# Patient Record
Sex: MALE | Race: BLACK OR AFRICAN AMERICAN | Employment: FULL TIME | URBAN - METROPOLITAN AREA
[De-identification: names, ages, dates, MRNs, and addresses within clinical notes are randomized per-mention and may not be internally consistent; named-entity substitution may affect disease eponyms.]

---
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
## 2024-03-27 ENCOUNTER — HOSPITAL ENCOUNTER (EMERGENCY)
Age: 19
Discharge: HOME | End: 2024-03-27
Attending: STUDENT IN AN ORGANIZED HEALTH CARE EDUCATION/TRAINING PROGRAM
Payer: COMMERCIAL

## 2024-03-27 ENCOUNTER — APPOINTMENT (OUTPATIENT)
Dept: GENERAL RADIOLOGY | Age: 19
End: 2024-03-27
Payer: COMMERCIAL

## 2024-03-27 VITALS
BODY MASS INDEX: 28.25 KG/M2 | HEIGHT: 67 IN | OXYGEN SATURATION: 98 % | HEART RATE: 82 BPM | WEIGHT: 180 LBS | SYSTOLIC BLOOD PRESSURE: 168 MMHG | RESPIRATION RATE: 16 BRPM | DIASTOLIC BLOOD PRESSURE: 76 MMHG | TEMPERATURE: 98.2 F

## 2024-03-27 DIAGNOSIS — K21.9 MILD ACID REFLUX: Primary | ICD-10-CM

## 2024-03-27 PROCEDURE — 74022 RADEX COMPL AQT ABD SERIES: CPT

## 2024-03-27 PROCEDURE — 93005 ELECTROCARDIOGRAM TRACING: CPT | Performed by: NURSE PRACTITIONER

## 2024-03-27 PROCEDURE — 99284 EMERGENCY DEPT VISIT MOD MDM: CPT

## 2024-03-27 PROCEDURE — 6370000000 HC RX 637 (ALT 250 FOR IP): Performed by: NURSE PRACTITIONER

## 2024-03-27 RX ORDER — MAG HYDROX/ALUMINUM HYD/SIMETH 400-400-40
15 SUSPENSION, ORAL (FINAL DOSE FORM) ORAL EVERY 6 HOURS PRN
Qty: 355 ML | Refills: 0 | Status: SHIPPED | OUTPATIENT
Start: 2024-03-27

## 2024-03-27 RX ORDER — MAGNESIUM HYDROXIDE/ALUMINUM HYDROXICE/SIMETHICONE 120; 1200; 1200 MG/30ML; MG/30ML; MG/30ML
30 SUSPENSION ORAL ONCE
Status: COMPLETED | OUTPATIENT
Start: 2024-03-27 | End: 2024-03-27

## 2024-03-27 RX ADMIN — ALUMINUM HYDROXIDE, MAGNESIUM HYDROXIDE, AND SIMETHICONE 30 ML: 1200; 120; 1200 SUSPENSION ORAL at 21:53

## 2024-03-27 ASSESSMENT — PAIN SCALES - GENERAL
PAINLEVEL_OUTOF10: 8
PAINLEVEL_OUTOF10: 8

## 2024-03-27 ASSESSMENT — VISUAL ACUITY: OU: 1

## 2024-03-27 ASSESSMENT — ENCOUNTER SYMPTOMS
COUGH: 0
ABDOMINAL PAIN: 0
SHORTNESS OF BREATH: 0
VOMITING: 0
BACK PAIN: 0
NAUSEA: 0

## 2024-03-27 ASSESSMENT — PAIN DESCRIPTION - LOCATION: LOCATION: CHEST

## 2024-03-27 ASSESSMENT — PAIN - FUNCTIONAL ASSESSMENT: PAIN_FUNCTIONAL_ASSESSMENT: 0-10

## 2024-03-28 NOTE — ED PROVIDER NOTES
chest pain.   Gastrointestinal:  Negative for abdominal pain, nausea and vomiting.   Musculoskeletal:  Negative for back pain.   Neurological:  Negative for dizziness.   All other systems reviewed and are negative.       Except as noted above the remainder of the review of systems was reviewed and negative.     PHYSICAL EXAM    (up to 7 for level 4, 8 or more for level 5)     ED Triage Vitals [03/27/24 2104]   BP Temp Temp Source Pulse Respirations SpO2 Height Weight - Scale   (!) 168/76 98.2 °F (36.8 °C) Oral 82 16 98 % 1.71 m (5' 7.32\") 81.6 kg (180 lb)     Physical Exam  Constitutional:       General: He is not in acute distress.     Appearance: Normal appearance. He is normal weight.   HENT:      Head: Normocephalic.      Right Ear: External ear normal.      Left Ear: External ear normal.      Nose: Nose normal.      Mouth/Throat:      Mouth: Mucous membranes are moist.   Eyes:      General: Lids are normal. Vision grossly intact.      Extraocular Movements: Extraocular movements intact.      Conjunctiva/sclera: Conjunctivae normal.   Cardiovascular:      Rate and Rhythm: Normal rate and regular rhythm.      Heart sounds: Normal heart sounds, S1 normal and S2 normal.   Pulmonary:      Effort: Pulmonary effort is normal.      Breath sounds: Normal breath sounds and air entry.   Chest:      Chest wall: No swelling, tenderness or crepitus.   Abdominal:      General: Bowel sounds are normal. There is no distension.      Palpations: Abdomen is soft.      Tenderness: There is no abdominal tenderness.      Hernia: No hernia is present.   Musculoskeletal:         General: Normal range of motion.      Cervical back: Full passive range of motion without pain, normal range of motion and neck supple.   Skin:     General: Skin is warm and dry.   Neurological:      Mental Status: He is alert and oriented to person, place, and time.      Cranial Nerves: Cranial nerves 2-12 are intact.      Sensory: Sensation is intact.

## 2024-03-28 NOTE — DISCHARGE INSTRUCTIONS
Take medication as prescribed.  Follow-up with your primary care provider.  Return to emergency department for worsening or new symptoms.

## 2024-03-29 LAB
EKG ATRIAL RATE: 63 BPM
EKG P AXIS: 63 DEGREES
EKG P-R INTERVAL: 138 MS
EKG Q-T INTERVAL: 384 MS
EKG QRS DURATION: 86 MS
EKG QTC CALCULATION (BAZETT): 392 MS
EKG R AXIS: 89 DEGREES
EKG T AXIS: 41 DEGREES
EKG VENTRICULAR RATE: 63 BPM

## 2024-05-14 ENCOUNTER — HOSPITAL ENCOUNTER (EMERGENCY)
Age: 19
Discharge: HOME | End: 2024-05-14
Attending: EMERGENCY MEDICINE
Payer: COMMERCIAL

## 2024-05-14 ENCOUNTER — APPOINTMENT (OUTPATIENT)
Dept: GENERAL RADIOLOGY | Age: 19
End: 2024-05-14
Payer: COMMERCIAL

## 2024-05-14 VITALS
HEART RATE: 63 BPM | DIASTOLIC BLOOD PRESSURE: 74 MMHG | SYSTOLIC BLOOD PRESSURE: 119 MMHG | TEMPERATURE: 98 F | RESPIRATION RATE: 16 BRPM | OXYGEN SATURATION: 100 %

## 2024-05-14 DIAGNOSIS — S43.005A DISLOCATION OF LEFT SHOULDER JOINT, INITIAL ENCOUNTER: Primary | ICD-10-CM

## 2024-05-14 PROCEDURE — 73030 X-RAY EXAM OF SHOULDER: CPT

## 2024-05-14 PROCEDURE — 99283 EMERGENCY DEPT VISIT LOW MDM: CPT

## 2024-05-14 ASSESSMENT — PAIN SCALES - GENERAL: PAINLEVEL_OUTOF10: 6

## 2024-05-14 ASSESSMENT — PAIN - FUNCTIONAL ASSESSMENT: PAIN_FUNCTIONAL_ASSESSMENT: 0-10

## 2024-05-14 NOTE — DISCHARGE INSTRUCTIONS
Return to this emergency room immediately if your symptoms persist, worsen or if new ones form.    Make sure you follow-up with Dr. Carreno within the next 1-2 business days.

## 2024-05-14 NOTE — ED PROVIDER NOTES
EMERGENCY DEPARTMENT ENCOUNTER    Pt Name: Alan Briscoe  MRN: [id number]  Birthdate 2005  Date of evaluation: 5/14/24  CHIEF COMPLAINT       Chief Complaint   Patient presents with    Shoulder Injury     Left, thinks it is out of place after wrestling with his brother x1.5 hours PTA to ED     HISTORY OF PRESENT ILLNESS   The history is provided by the patient and medical records.    The patient is a 19-year-old male who presents to the ED for left shoulder dislocate.  Patient states he was wrestling with his brother earlier this evening and felt his shoulder pop out of joint.  He felt as if the shoulder was hanging lower than it typically does.  After few minutes it popped back in and he came into the ED for further evaluation.  Denies prior shoulder dislocation.  No other injuries reported.  No numbness or tingling.  No weakness.    REVIEW OF SYSTEMS     Review of Systems  All other systems reviewed and are negative.    PASTMEDICAL HISTORY   History reviewed. No pertinent past medical history.  Past Problem List  There is no problem list on file for this patient.    SURGICAL HISTORY     History reviewed. No pertinent surgical history.  CURRENT MEDICATIONS       Discharge Medication List as of 5/14/2024  3:33 AM        CONTINUE these medications which have NOT CHANGED    Details   aluminum & magnesium hydroxide-simethicone (MAALOX MAX) 400-400-40 MG/5ML SUSP Take 15 mLs by mouth every 6 hours as needed (Acid reflux), Disp-355 mL, R-0Normal           ALLERGIES     has No Known Allergies.  FAMILY HISTORY     has no family status information on file.      SOCIAL HISTORY       Social History     Tobacco Use    Smoking status: Never    Smokeless tobacco: Never   Substance Use Topics    Alcohol use: No    Drug use: No     PHYSICAL EXAM     INITIAL VITALS: /74   Pulse 63   Temp 98 °F (36.7 °C) (Oral)   Resp 16   SpO2 100%    Physical Exam  Constitutional:       Appearance: Normal appearance.   HENT:

## 2024-05-20 ENCOUNTER — OFFICE VISIT (OUTPATIENT)
Dept: ORTHOPEDIC SURGERY | Age: 19
End: 2024-05-20
Payer: COMMERCIAL

## 2024-05-20 VITALS — RESPIRATION RATE: 16 BRPM | HEIGHT: 67 IN | WEIGHT: 180 LBS | BODY MASS INDEX: 28.25 KG/M2

## 2024-05-20 DIAGNOSIS — S43.015A ANTERIOR SHOULDER DISLOCATION, LEFT, INITIAL ENCOUNTER: Primary | ICD-10-CM

## 2024-05-20 PROCEDURE — 99203 OFFICE O/P NEW LOW 30 MIN: CPT

## 2024-05-20 NOTE — PROGRESS NOTES
never smoked. He has never used smokeless tobacco. He reports that he does not drink alcohol and does not use drugs.    Family History  Phillips's family history is not on file.      Review of Systems   History obtained from the patient.   REVIEW OF SYSTEMS:   Constitution: negative for fever, chills, weight loss or malaise   Musculoskeletal: As noted in the HPI   Neurologic: As noted in the HPI    Physical Exam  Resp 16   Ht 1.702 m (5' 7\")   Wt 81.6 kg (180 lb)   BMI 28.19 kg/m²    General Appearance: alert, well appearing, and in no distress  Mental Status: alert, oriented to person, place, and time    Left shoulder and upper extremity: No warmth, erythema, ecchymosis or obvious swelling.  No obvious deformity.  2+ radial pulse with brisk capillary refill.  Sensation is grossly intact to light touch in all dermatomal patterns.  Radial, ulnar, median nerve motor function is intact.  Patient is able to actively flex and extend all fingers.  Full range of motion at the elbow, wrist, hand.  Patient does have some tightness and restriction with gentle passive forward elevation.  He does have some tenderness to palpation over the posterior aspect of the shoulder.  Mild pain with cross body adduction.    Imaging Studies  2 view xrays of the left shoulder obtained on 5/14/2024 were independently reviewed demonstrating no acute fracture, dislocation, subluxation.  Glenohumeral joint is is appropriately aligned.    Diagnostics and Labs  Relevant diagnostic, laboratory and radiological studies have been reviewed in the Electronic Medical Record.    Assessment and Plan  Alan Briscoe is a 19 y.o. old male who presented to the office today for evaluation of left shoulder pain after he experienced a first-time dislocation about a week ago.  I did discuss the etiology of a dislocated shoulder with the patient as well as treatment options in the form of conservative versus surgical management.  At this time we will proceed

## 2024-05-23 ENCOUNTER — TELEPHONE (OUTPATIENT)
Dept: ORTHOPEDIC SURGERY | Age: 19
End: 2024-05-23

## 2024-05-23 NOTE — TELEPHONE ENCOUNTER
Spoke with patient and evidently he and the physical therapy clinic have been playing phone tag where neither party have been able to speak directly to each other but have been leaving messages. There also seems to be some issue with the phone number that the patient had on file. Patient states that he is going to call PT clinic again to see about scheduling appt. Patient was instructed to call the office if for some reason the script needs to be faxed to any other physical therapy clinic.

## 2024-07-01 ENCOUNTER — OFFICE VISIT (OUTPATIENT)
Dept: ORTHOPEDIC SURGERY | Age: 19
End: 2024-07-01
Payer: COMMERCIAL

## 2024-07-01 VITALS — RESPIRATION RATE: 16 BRPM | WEIGHT: 179.9 LBS | BODY MASS INDEX: 28.24 KG/M2 | HEIGHT: 67 IN

## 2024-07-01 DIAGNOSIS — S43.015D ANTERIOR SHOULDER DISLOCATION, LEFT, SUBSEQUENT ENCOUNTER: Primary | ICD-10-CM

## 2024-07-01 PROCEDURE — 99213 OFFICE O/P EST LOW 20 MIN: CPT

## 2024-07-01 NOTE — PROGRESS NOTES
1.702 m (5' 7.01\")   Wt 81.6 kg (179 lb 14.3 oz)   BMI 28.17 kg/m²    Constitutional: Patient is oriented to person, place, and time. Patient appears well-developed and well nourished.   Mental Status/psychiatric: Behavior is normal. Thought content normal.  HENT: Negative otherwise noted  Head: Normocephalic and Atraumatic  Nose: Normal  Respiratory/Cardio: Effort normal. No respiratory distress.  Neck: Normal range of motion Neck supple.    Shoulder:    Skin: Skin is warm and dry; no swelling or obvious muscular atrophy.  No ecchymosis or obvious deformity.  Vasculature: 2+ radial pulses bilaterally  Neuro: Sensation grossly intact to light touch diffusely  Tenderness: Mild tenderness over the posterior lateral aspect of the shoulder.  No tenderness to palpation throughout the remaining shoulder.    ROM: (Degrees)    Right   A P   Left   A P    Elevation  150    Elevation  150 155  Abduction  160    Abduction  160  160  ER   65    ER   60 65  IR   L2    IR   L2   90 abd/ER      90 abd/ER     90 abd/IR      90 abd/IR     Crepitation  No    Crepitation No  Dyskenesia  No    Dyskenesia No      Muscle strength:    Right       Left    Deltoid   5    Deltoid   5  Supraspinatus  5    Supraspinatus  5  ER   5    ER   5  IR   5    IR   5    Special tests    Right   Rotator Cuff    Left    n   Painful arc    n   n   Pain with ER    n    n   Neer's     n    n   Hawkin's    n    n   Drop Arm    n  n   Lift off/Belly Press   n  n   ER Lag    n          AC Joint  n   AC tenderness   n  n   Cross-chest adduction  n       Labrum/biceps    n   Rice's    n   n   Biceps sheer    n      n   Speed's/Yergason's   n    n   Tenderness Biceps Groove  n    n   Lee's    n         Instability  n   Ant Apprehension   Y (mild)    n   Post Apprehension   n    n   Ant Load shift    n    n   Post Load shift   n   n   Sulcus     n  n   Generalized Laxity   n  n   Relocation test   y  n   Crank test     n  n   Koko-superior

## 2024-08-12 ENCOUNTER — OFFICE VISIT (OUTPATIENT)
Dept: ORTHOPEDIC SURGERY | Age: 19
End: 2024-08-12
Payer: COMMERCIAL

## 2024-08-12 VITALS — HEIGHT: 67 IN | WEIGHT: 192.2 LBS | BODY MASS INDEX: 30.17 KG/M2

## 2024-08-12 DIAGNOSIS — S43.015D ANTERIOR DISLOCATION OF LEFT SHOULDER, SUBSEQUENT ENCOUNTER: Primary | ICD-10-CM

## 2024-08-12 DIAGNOSIS — S43.015A ANTERIOR SHOULDER DISLOCATION, LEFT, INITIAL ENCOUNTER: ICD-10-CM

## 2024-08-12 DIAGNOSIS — M25.512 LEFT SHOULDER PAIN, UNSPECIFIED CHRONICITY: ICD-10-CM

## 2024-08-12 PROCEDURE — 99213 OFFICE O/P EST LOW 20 MIN: CPT

## 2024-08-14 NOTE — PROGRESS NOTES
HPI: Mr. Briscoe is a 19-year-old male who presents to clinic today for a 3-month follow-up regarding his first-time anterior shoulder dislocation.  He states that he has been attending physical therapy and at this point has concluded therapy.  He states he feels like he is making good improvements in his range of motion and his strength although does still have some discomfort when he gets into certain positions.  He states that he has not had any additional shoulder dislocations or subluxation episodes.  Examination of the left shoulder and upper extremity demonstrate the skin to be clean, dry and intact without warmth erythema or ecchymosis.  No obvious swelling or deformity.  He has full range of motion at the shoulder and the feels of elevation, abduction, external rotation and internal rotation.  He has no painful arc of motion and negative Neer and Eugene.  He has a negative [place]'s.  He does have positive anterior apprehension as well as a positive relocation test unable to elicit a positive load shift on exam today.  He has 5 out of 5 muscle strength testing of the deltoid, supraspinatus, external rotation and internal rotation.    Impression/plan: Mr. Briscoe is a 19-year-old male who presents to clinic today for a 3-month follow-up regarding his first-time anterior shoulder dislocation.  We did again discuss his etiologies today including treatment options available to him at this time.  I do believe he is overall doing relatively well but he does again voiced concern that he has discomfort in certain positions and does feel unstable when he gets overhead.  We did again discuss that he could have a labrum tear associated with the dislocation he experienced 3 months ago.  We did discuss that we could continue with his exercises that he learned in therapy and follow-up if any more issues arise or we could proceed at this time with an MRI arthrogram to evaluate for any labral etiology.  At this time given

## 2024-08-15 ENCOUNTER — TELEPHONE (OUTPATIENT)
Dept: INTERVENTIONAL RADIOLOGY/VASCULAR | Age: 19
End: 2024-08-15

## 2024-09-06 ENCOUNTER — TELEPHONE (OUTPATIENT)
Dept: INTERVENTIONAL RADIOLOGY/VASCULAR | Age: 19
End: 2024-09-06

## 2024-10-24 ENCOUNTER — APPOINTMENT (OUTPATIENT)
Dept: GENERAL RADIOLOGY | Age: 19
End: 2024-10-24
Payer: COMMERCIAL

## 2024-10-24 ENCOUNTER — HOSPITAL ENCOUNTER (EMERGENCY)
Age: 19
Discharge: HOME | End: 2024-10-25
Attending: STUDENT IN AN ORGANIZED HEALTH CARE EDUCATION/TRAINING PROGRAM
Payer: COMMERCIAL

## 2024-10-24 VITALS
DIASTOLIC BLOOD PRESSURE: 64 MMHG | BODY MASS INDEX: 28.25 KG/M2 | HEIGHT: 67 IN | WEIGHT: 180 LBS | TEMPERATURE: 98.2 F | HEART RATE: 94 BPM | RESPIRATION RATE: 18 BRPM | SYSTOLIC BLOOD PRESSURE: 124 MMHG | OXYGEN SATURATION: 97 %

## 2024-10-24 DIAGNOSIS — M23.91 INTERNAL DERANGEMENT OF RIGHT KNEE: Primary | ICD-10-CM

## 2024-10-24 DIAGNOSIS — S86.811A RUPTURE OF RIGHT PATELLAR TENDON, INITIAL ENCOUNTER: ICD-10-CM

## 2024-10-24 PROCEDURE — 99283 EMERGENCY DEPT VISIT LOW MDM: CPT

## 2024-10-24 PROCEDURE — 6370000000 HC RX 637 (ALT 250 FOR IP): Performed by: PHYSICIAN ASSISTANT

## 2024-10-24 PROCEDURE — 73562 X-RAY EXAM OF KNEE 3: CPT

## 2024-10-24 RX ORDER — IBUPROFEN 800 MG/1
800 TABLET, FILM COATED ORAL ONCE
Status: COMPLETED | OUTPATIENT
Start: 2024-10-24 | End: 2024-10-24

## 2024-10-24 RX ADMIN — IBUPROFEN 800 MG: 800 TABLET ORAL at 23:37

## 2024-10-24 ASSESSMENT — PAIN SCALES - GENERAL
PAINLEVEL_OUTOF10: 7
PAINLEVEL_OUTOF10: 6

## 2024-10-24 ASSESSMENT — PAIN DESCRIPTION - LOCATION: LOCATION: KNEE

## 2024-10-24 ASSESSMENT — PAIN - FUNCTIONAL ASSESSMENT: PAIN_FUNCTIONAL_ASSESSMENT: 0-10

## 2024-10-25 ENCOUNTER — HOSPITAL ENCOUNTER (OUTPATIENT)
Dept: MRI IMAGING | Age: 19
Discharge: HOME | End: 2024-10-27
Payer: COMMERCIAL

## 2024-10-25 ENCOUNTER — OFFICE VISIT (OUTPATIENT)
Dept: ORTHOPEDIC SURGERY | Age: 19
End: 2024-10-25

## 2024-10-25 VITALS — WEIGHT: 190 LBS | RESPIRATION RATE: 18 BRPM | HEIGHT: 67 IN | BODY MASS INDEX: 29.82 KG/M2

## 2024-10-25 DIAGNOSIS — M25.561 ACUTE PAIN OF RIGHT KNEE: ICD-10-CM

## 2024-10-25 DIAGNOSIS — S86.811A PATELLAR TENDON RUPTURE, RIGHT, INITIAL ENCOUNTER: Primary | ICD-10-CM

## 2024-10-25 DIAGNOSIS — S86.811A PATELLAR TENDON RUPTURE, RIGHT, INITIAL ENCOUNTER: ICD-10-CM

## 2024-10-25 PROCEDURE — 73721 MRI JNT OF LWR EXTRE W/O DYE: CPT

## 2024-10-25 RX ORDER — 0.9 % SODIUM CHLORIDE 0.9 %
500 INTRAVENOUS SOLUTION INTRAVENOUS ONCE
Status: DISCONTINUED | OUTPATIENT
Start: 2024-10-25 | End: 2024-10-25

## 2024-10-25 RX ORDER — HYDROCODONE BITARTRATE AND ACETAMINOPHEN 5; 325 MG/1; MG/1
1 TABLET ORAL EVERY 6 HOURS PRN
Qty: 12 TABLET | Refills: 0 | Status: SHIPPED | OUTPATIENT
Start: 2024-10-25 | End: 2024-10-28

## 2024-10-25 ASSESSMENT — ENCOUNTER SYMPTOMS
COLOR CHANGE: 0
VOMITING: 0
COUGH: 0
SHORTNESS OF BREATH: 0

## 2024-10-25 NOTE — ED PROVIDER NOTES
eMERGENCY dEPARTMENT eNCOUnter   Independent Attestation     Pt Name: Alan Briscoe  MRN: [id number]  Birthdate 2005  Date of evaluation: 10/25/24     Alan Briscoe is a 19 y.o. male with CC: Knee Injury (Right knee. Pt states he was playing basketball. Pt states he went knee on knee with someone else. Pt thinks he dislocated his knee cap and put it back in. )    X-ray obtained showing high riding patella given patient's significant limited range of motion with suspect patellar tendon rupture, placed in knee immobilizer given orthopedic follow-up.  Pain controlled.    This visit was performed by both a physician and an APC. I performed all aspects of the MDM as documented.      FINAL IMPRESSION      1. Internal derangement of right knee    2. Rupture of right patellar tendon, initial encounter          DISPOSITION/PLAN   DISPOSITION Decision To Discharge 10/25/2024 12:24:39 AM           Josue Morgan DO  Attending Emergency Physician         Josue Morgan DO  10/25/24 0512       Juliane Garibay PA-C  10/25/24 0451

## 2024-10-25 NOTE — PROGRESS NOTES
[facility] PHYSICIANS [facility] ORTHOPEDICS AND SPORTS MEDICINE  [address]  [place] [address]  Dept: [phone]    Ambulatory Orthopedic New Patient Visit      CHIEF COMPLAINT:    Chief Complaint   Patient presents with    Knee Pain     R Knee Injury       HISTORY OF PRESENT ILLNESS:      Date of Injury: 10/24/2024    The patient is a 19 y.o. male who is being seen  for consultation and evaluation of acute right knee injury sustained on 10/24/2024.  Patient notes that he was playing basketball yesterday and hit the anterior aspect of the right knee on somebody else's knee.  Patient felt as if his kneecap dislocated and he was able to put it back in place.  He was evaluated at [facility] emergency department shortly after the injury and x-rays of the right knee were obtained revealing suspected patellar tendon rupture.  He was given a knee immobilizer and was instructed to follow-up in our office today.    Patient was also given a prescription for Norco to help with pain control but states that he has not taken the medication yet.  He notes that he has not been able to actively extend the right knee.  Patient has been compliant with wearing the knee immobilizer and utilizing crutches.    Patient is accompanied by his father today in office.      Past Medical History:      No past medical history on file.    Past Surgical History:    No past surgical history on file.    Current Medications:   Current Outpatient Medications   Medication Sig Dispense Refill    HYDROcodone-acetaminophen (NORCO) 5-325 MG per tablet Take 1 tablet by mouth every 6 hours as needed for Pain for up to 3 days. Intended supply: 3 days. Take lowest dose possible to manage pain Max Daily Amount: 4 tablets 12 tablet 0    aluminum & magnesium hydroxide-simethicone (MAALOX MAX) 400-400-40 MG/5ML SUSP Take 15 mLs by mouth every 6 hours as needed (Acid reflux) (Patient not

## 2024-10-25 NOTE — ED PROVIDER NOTES
EMERGENCY DEPARTMENT ENCOUNTER    Pt Name: Alan Briscoe  MRN: [id number]  Birthdate 2005  Date of evaluation: 10/24/24  CHIEF COMPLAINT       Chief Complaint   Patient presents with    Knee Injury     Right knee. Pt states he was playing basketball. Pt states he went knee on knee with someone else. Pt thinks he dislocated his knee cap and put it back in.      HISTORY OF PRESENT ILLNESS   HPI       REVIEW OF SYSTEMS     Review of Systems   Musculoskeletal:  Positive for arthralgias (right knee), gait problem and joint swelling.   Neurological:  Negative for numbness.     PASTMEDICAL HISTORY   History reviewed. No pertinent past medical history.  Past Problem List  There is no problem list on file for this patient.    SURGICAL HISTORY     History reviewed. No pertinent surgical history.  CURRENT MEDICATIONS       Discharge Medication List as of 10/25/2024 12:54 AM        CONTINUE these medications which have NOT CHANGED    Details   aluminum & magnesium hydroxide-simethicone (MAALOX MAX) 400-400-40 MG/5ML SUSP Take 15 mLs by mouth every 6 hours as needed (Acid reflux), Disp-355 mL, R-0Normal           ALLERGIES     has No Known Allergies.  FAMILY HISTORY     has no family status information on file.      SOCIAL HISTORY       Social History     Tobacco Use    Smoking status: Never    Smokeless tobacco: Never   Substance Use Topics    Alcohol use: No    Drug use: No     PHYSICAL EXAM     INITIAL VITALS: /64   Pulse 94   Temp 98.2 °F (36.8 °C) (Oral)   Resp 18   Ht 1.702 m (5' 7\")   Wt 81.6 kg (180 lb)   SpO2 97%   BMI 28.19 kg/m²    Physical Exam  Vitals and nursing note reviewed.   Constitutional:       Appearance: Normal appearance.   Cardiovascular:      Rate and Rhythm: Normal rate and regular rhythm.      Pulses: Normal pulses.      Heart sounds: Normal heart sounds.   Pulmonary:      Effort: Pulmonary effort is normal.      Breath sounds: Normal breath sounds.   Musculoskeletal:      Comments:

## 2024-10-25 NOTE — PATIENT INSTRUCTIONS
PATIENTIQ:  PatientIQ helps [facility] stay in touch with you to know how you're feeling, and provides education and care instructions to you at various time points.   Your answers help your care team track your progress to provide the best care possible. PatientIQ will contact you pre-op and post-op via email or text with:  Educational Videos and Care Instructions  Questionnaires About How You're Feeling    Your participation provides you valuable education and helps [facility] continue to provide quality care to all patients. Thank you

## 2024-10-25 NOTE — DISCHARGE INSTRUCTIONS
Rest, ice, elevate the knee.  Nonweightbearing until follow-up with orthopedics.  Use Tylenol or Motrin every 8 hours as needed for pain.

## 2024-10-26 ENCOUNTER — TELEPHONE (OUTPATIENT)
Dept: ORTHOPEDIC SURGERY | Age: 19
End: 2024-10-26

## 2024-10-26 DIAGNOSIS — S86.811A PATELLAR TENDON RUPTURE, RIGHT, INITIAL ENCOUNTER: Primary | ICD-10-CM

## 2024-10-26 RX ORDER — IBUPROFEN 800 MG/1
800 TABLET, FILM COATED ORAL
Qty: 90 TABLET | Refills: 0 | Status: SHIPPED | OUTPATIENT
Start: 2024-10-26

## 2024-10-26 RX ORDER — ACETAMINOPHEN 500 MG
1000 TABLET ORAL EVERY 6 HOURS PRN
Qty: 90 TABLET | Refills: 0 | Status: SHIPPED | OUTPATIENT
Start: 2024-10-26 | End: 2024-10-26 | Stop reason: CLARIF

## 2024-10-26 RX ORDER — ACETAMINOPHEN 500 MG
1000 TABLET ORAL EVERY 6 HOURS PRN
Qty: 90 TABLET | Refills: 0 | Status: SHIPPED | OUTPATIENT
Start: 2024-10-26

## 2024-10-26 NOTE — TELEPHONE ENCOUNTER
I called the patient in regards to his right knee MRI results.    Preliminary results listed below:    IMPRESSION:  Full-thickness rupture of the infrapatellar tendon near the inferior pole of  the patella with approximately 18 mm retraction of the tendon fibers.  Extensive associated hemorrhage, fluid and edema extending into the  infrapatellar space.     Mild lateral patellar tilt and subluxation of the patella.    Patient will be scheduled to have a right knee patellar tendon repair on Tuesday, 10/29/2024 with Dr. Malachi Bauer DO.  Patient is in a considerable amount of pain but states that he is only taken 1 dose of the Norco that was prescribed from the emergency department.  I did discuss with the patient that he should continue to ice and elevate the right upper lower extremity and utilize an Ace wrap and the knee immobilizer for comfort and support.  Patient is to keep the knee straight at all times.  He was given a prescription for ibuprofen 800 mg and Tylenol 1000 mg to take up to 3 times daily for continued pain.    Our surgery scheduler will call them Monday with surgery time for Tuesday, 10/30/2024.

## 2024-10-28 ENCOUNTER — PREP FOR PROCEDURE (OUTPATIENT)
Dept: ORTHOPEDIC SURGERY | Age: 19
End: 2024-10-28

## 2024-10-28 DIAGNOSIS — S86.811A RUPTURE, TENDON, PATELLAR, RIGHT, INITIAL ENCOUNTER: ICD-10-CM

## 2024-10-29 ENCOUNTER — ANESTHESIA (OUTPATIENT)
Dept: OPERATING ROOM | Age: 19
End: 2024-10-29
Payer: COMMERCIAL

## 2024-10-29 ENCOUNTER — ANESTHESIA EVENT (OUTPATIENT)
Dept: OPERATING ROOM | Age: 19
End: 2024-10-29
Payer: COMMERCIAL

## 2024-10-29 ENCOUNTER — HOSPITAL ENCOUNTER (OUTPATIENT)
Age: 19
Setting detail: OUTPATIENT SURGERY
Discharge: HOME | End: 2024-10-29
Attending: ORTHOPAEDIC SURGERY | Admitting: ORTHOPAEDIC SURGERY
Payer: COMMERCIAL

## 2024-10-29 ENCOUNTER — APPOINTMENT (OUTPATIENT)
Dept: GENERAL RADIOLOGY | Age: 19
End: 2024-10-29
Attending: ORTHOPAEDIC SURGERY
Payer: COMMERCIAL

## 2024-10-29 VITALS
OXYGEN SATURATION: 98 % | WEIGHT: 190 LBS | TEMPERATURE: 97.7 F | HEART RATE: 96 BPM | RESPIRATION RATE: 21 BRPM | HEIGHT: 67 IN | BODY MASS INDEX: 29.82 KG/M2 | SYSTOLIC BLOOD PRESSURE: 137 MMHG | DIASTOLIC BLOOD PRESSURE: 72 MMHG

## 2024-10-29 DIAGNOSIS — G89.18 POST-OP PAIN: Primary | ICD-10-CM

## 2024-10-29 PROCEDURE — 7100000010 HC PHASE II RECOVERY - FIRST 15 MIN: Performed by: ORTHOPAEDIC SURGERY

## 2024-10-29 PROCEDURE — 6360000002 HC RX W HCPCS: Performed by: ORTHOPAEDIC SURGERY

## 2024-10-29 PROCEDURE — 7100000000 HC PACU RECOVERY - FIRST 15 MIN: Performed by: ORTHOPAEDIC SURGERY

## 2024-10-29 PROCEDURE — 73560 X-RAY EXAM OF KNEE 1 OR 2: CPT

## 2024-10-29 PROCEDURE — 7100000011 HC PHASE II RECOVERY - ADDTL 15 MIN: Performed by: ORTHOPAEDIC SURGERY

## 2024-10-29 PROCEDURE — 3600000012 HC SURGERY LEVEL 2 ADDTL 15MIN: Performed by: ORTHOPAEDIC SURGERY

## 2024-10-29 PROCEDURE — 7100000001 HC PACU RECOVERY - ADDTL 15 MIN: Performed by: ORTHOPAEDIC SURGERY

## 2024-10-29 PROCEDURE — 3700000001 HC ADD 15 MINUTES (ANESTHESIA): Performed by: ORTHOPAEDIC SURGERY

## 2024-10-29 PROCEDURE — 6370000000 HC RX 637 (ALT 250 FOR IP): Performed by: ANESTHESIOLOGY

## 2024-10-29 PROCEDURE — 6360000002 HC RX W HCPCS: Performed by: NURSE ANESTHETIST, CERTIFIED REGISTERED

## 2024-10-29 PROCEDURE — 3600000002 HC SURGERY LEVEL 2 BASE: Performed by: ORTHOPAEDIC SURGERY

## 2024-10-29 PROCEDURE — 2500000003 HC RX 250 WO HCPCS: Performed by: NURSE ANESTHETIST, CERTIFIED REGISTERED

## 2024-10-29 PROCEDURE — 2580000003 HC RX 258: Performed by: ANESTHESIOLOGY

## 2024-10-29 PROCEDURE — 3700000000 HC ANESTHESIA ATTENDED CARE: Performed by: ORTHOPAEDIC SURGERY

## 2024-10-29 PROCEDURE — C1713 ANCHOR/SCREW BN/BN,TIS/BN: HCPCS | Performed by: ORTHOPAEDIC SURGERY

## 2024-10-29 PROCEDURE — 2709999900 HC NON-CHARGEABLE SUPPLY: Performed by: ORTHOPAEDIC SURGERY

## 2024-10-29 PROCEDURE — C1763 CONN TISS, NON-HUMAN: HCPCS | Performed by: ORTHOPAEDIC SURGERY

## 2024-10-29 DEVICE — IB KIT, PLUS, BC, W/ CC FT AND JUMPSTART
Type: IMPLANTABLE DEVICE | Site: KNEE | Status: FUNCTIONAL
Brand: ARTHREX®

## 2024-10-29 DEVICE — IMPLANTABLE DEVICE
Type: IMPLANTABLE DEVICE | Site: KNEE | Status: FUNCTIONAL
Brand: BIOINDUCTIVE IMPLANT WITH ARTHROSCOPIC DELIVERY SYSTEM - LARGE

## 2024-10-29 RX ORDER — LIDOCAINE HYDROCHLORIDE 20 MG/ML
INJECTION, SOLUTION EPIDURAL; INFILTRATION; INTRACAUDAL; PERINEURAL
Status: DISCONTINUED | OUTPATIENT
Start: 2024-10-29 | End: 2024-10-29 | Stop reason: SDUPTHER

## 2024-10-29 RX ORDER — PROPOFOL 10 MG/ML
INJECTION, EMULSION INTRAVENOUS
Status: DISCONTINUED | OUTPATIENT
Start: 2024-10-29 | End: 2024-10-29 | Stop reason: SDUPTHER

## 2024-10-29 RX ORDER — FENTANYL CITRATE 50 UG/ML
50 INJECTION, SOLUTION INTRAMUSCULAR; INTRAVENOUS EVERY 5 MIN PRN
Status: DISCONTINUED | OUTPATIENT
Start: 2024-10-29 | End: 2024-10-29 | Stop reason: HOSPADM

## 2024-10-29 RX ORDER — BUPIVACAINE HYDROCHLORIDE 5 MG/ML
INJECTION, SOLUTION EPIDURAL; INTRACAUDAL
Status: DISCONTINUED
Start: 2024-10-29 | End: 2024-10-29 | Stop reason: HOSPADM

## 2024-10-29 RX ORDER — SODIUM CHLORIDE, SODIUM LACTATE, POTASSIUM CHLORIDE, CALCIUM CHLORIDE 600; 310; 30; 20 MG/100ML; MG/100ML; MG/100ML; MG/100ML
INJECTION, SOLUTION INTRAVENOUS CONTINUOUS
Status: DISCONTINUED | OUTPATIENT
Start: 2024-10-29 | End: 2024-10-29 | Stop reason: HOSPADM

## 2024-10-29 RX ORDER — FENTANYL CITRATE 50 UG/ML
INJECTION, SOLUTION INTRAMUSCULAR; INTRAVENOUS
Status: DISCONTINUED | OUTPATIENT
Start: 2024-10-29 | End: 2024-10-29 | Stop reason: SDUPTHER

## 2024-10-29 RX ORDER — DEXAMETHASONE SODIUM PHOSPHATE 10 MG/ML
INJECTION, SOLUTION INTRAMUSCULAR; INTRAVENOUS
Status: DISCONTINUED | OUTPATIENT
Start: 2024-10-29 | End: 2024-10-29 | Stop reason: SDUPTHER

## 2024-10-29 RX ORDER — DIPHENHYDRAMINE HYDROCHLORIDE 50 MG/ML
12.5 INJECTION INTRAMUSCULAR; INTRAVENOUS
Status: DISCONTINUED | OUTPATIENT
Start: 2024-10-29 | End: 2024-10-29 | Stop reason: HOSPADM

## 2024-10-29 RX ORDER — ONDANSETRON 2 MG/ML
4 INJECTION INTRAMUSCULAR; INTRAVENOUS
Status: DISCONTINUED | OUTPATIENT
Start: 2024-10-29 | End: 2024-10-29 | Stop reason: HOSPADM

## 2024-10-29 RX ORDER — PROCHLORPERAZINE EDISYLATE 5 MG/ML
5 INJECTION INTRAMUSCULAR; INTRAVENOUS
Status: DISCONTINUED | OUTPATIENT
Start: 2024-10-29 | End: 2024-10-29 | Stop reason: HOSPADM

## 2024-10-29 RX ORDER — OXYCODONE AND ACETAMINOPHEN 5; 325 MG/1; MG/1
1 TABLET ORAL EVERY 6 HOURS PRN
Qty: 28 TABLET | Refills: 0 | Status: SHIPPED | OUTPATIENT
Start: 2024-10-29 | End: 2024-11-05

## 2024-10-29 RX ORDER — MIDAZOLAM HYDROCHLORIDE 1 MG/ML
INJECTION, SOLUTION INTRAMUSCULAR; INTRAVENOUS
Status: DISCONTINUED | OUTPATIENT
Start: 2024-10-29 | End: 2024-10-29 | Stop reason: SDUPTHER

## 2024-10-29 RX ORDER — FENTANYL CITRATE 50 UG/ML
25 INJECTION, SOLUTION INTRAMUSCULAR; INTRAVENOUS EVERY 5 MIN PRN
Status: DISCONTINUED | OUTPATIENT
Start: 2024-10-29 | End: 2024-10-29 | Stop reason: HOSPADM

## 2024-10-29 RX ORDER — BUPIVACAINE HYDROCHLORIDE 5 MG/ML
INJECTION, SOLUTION EPIDURAL; INTRACAUDAL PRN
Status: DISCONTINUED | OUTPATIENT
Start: 2024-10-29 | End: 2024-10-29 | Stop reason: ALTCHOICE

## 2024-10-29 RX ORDER — ONDANSETRON 2 MG/ML
INJECTION INTRAMUSCULAR; INTRAVENOUS
Status: DISCONTINUED | OUTPATIENT
Start: 2024-10-29 | End: 2024-10-29 | Stop reason: SDUPTHER

## 2024-10-29 RX ORDER — SODIUM CHLORIDE 9 MG/ML
INJECTION, SOLUTION INTRAVENOUS CONTINUOUS
Status: DISCONTINUED | OUTPATIENT
Start: 2024-10-29 | End: 2024-10-29 | Stop reason: HOSPADM

## 2024-10-29 RX ORDER — OXYCODONE HYDROCHLORIDE 5 MG/1
5 TABLET ORAL
Status: COMPLETED | OUTPATIENT
Start: 2024-10-29 | End: 2024-10-29

## 2024-10-29 RX ADMIN — FENTANYL CITRATE 50 MCG: 50 INJECTION INTRAMUSCULAR; INTRAVENOUS at 14:39

## 2024-10-29 RX ADMIN — PROPOFOL 250 MG: 10 INJECTION, EMULSION INTRAVENOUS at 13:31

## 2024-10-29 RX ADMIN — Medication 2000 MG: at 13:38

## 2024-10-29 RX ADMIN — MIDAZOLAM 2 MG: 1 INJECTION INTRAMUSCULAR; INTRAVENOUS at 13:24

## 2024-10-29 RX ADMIN — FENTANYL CITRATE 50 MCG: 50 INJECTION INTRAMUSCULAR; INTRAVENOUS at 13:54

## 2024-10-29 RX ADMIN — FENTANYL CITRATE 75 MCG: 50 INJECTION INTRAMUSCULAR; INTRAVENOUS at 13:31

## 2024-10-29 RX ADMIN — FENTANYL CITRATE 25 MCG: 50 INJECTION INTRAMUSCULAR; INTRAVENOUS at 13:47

## 2024-10-29 RX ADMIN — OXYCODONE HYDROCHLORIDE 5 MG: 5 TABLET ORAL at 16:53

## 2024-10-29 RX ADMIN — LIDOCAINE HYDROCHLORIDE 60 MG: 20 INJECTION, SOLUTION EPIDURAL; INFILTRATION; INTRACAUDAL; PERINEURAL at 13:31

## 2024-10-29 RX ADMIN — DEXAMETHASONE SODIUM PHOSPHATE 10 MG: 10 INJECTION, SOLUTION INTRAMUSCULAR; INTRAVENOUS at 13:38

## 2024-10-29 RX ADMIN — ONDANSETRON 4 MG: 2 INJECTION, SOLUTION INTRAMUSCULAR; INTRAVENOUS at 14:25

## 2024-10-29 RX ADMIN — SODIUM CHLORIDE: 9 INJECTION, SOLUTION INTRAVENOUS at 13:12

## 2024-10-29 ASSESSMENT — PAIN DESCRIPTION - ONSET
ONSET: ON-GOING

## 2024-10-29 ASSESSMENT — PAIN DESCRIPTION - ORIENTATION
ORIENTATION: RIGHT

## 2024-10-29 ASSESSMENT — PAIN SCALES - GENERAL
PAINLEVEL_OUTOF10: 6
PAINLEVEL_OUTOF10: 4
PAINLEVEL_OUTOF10: 10
PAINLEVEL_OUTOF10: 10
PAINLEVEL_OUTOF10: 7

## 2024-10-29 ASSESSMENT — PAIN DESCRIPTION - DESCRIPTORS
DESCRIPTORS: ACHING
DESCRIPTORS: ACHING;THROBBING

## 2024-10-29 ASSESSMENT — PAIN DESCRIPTION - LOCATION
LOCATION: KNEE

## 2024-10-29 ASSESSMENT — PAIN DESCRIPTION - FREQUENCY
FREQUENCY: CONTINUOUS

## 2024-10-29 ASSESSMENT — PAIN - FUNCTIONAL ASSESSMENT: PAIN_FUNCTIONAL_ASSESSMENT: 0-10

## 2024-10-29 ASSESSMENT — PAIN DESCRIPTION - PAIN TYPE
TYPE: SURGICAL PAIN

## 2024-10-29 NOTE — ANESTHESIA POSTPROCEDURE EVALUATION
Department of Anesthesiology  Postprocedure Note    Patient: Alan Briscoe  MRN: [id number]  YOB: 2005  Date of evaluation: 10/29/2024    Procedure Summary       Date: 10/29/24 Room / Location: [address]    Anesthesia Start: 1326 Anesthesia Stop: 1506    Procedure: RIGHT KNEE PATELLA TENDON REPAIR WITH BIOINDUCTIVE IMPLANT (Right: Knee) Diagnosis:       Rupture, tendon, patellar, right, initial encounter      (Rupture, tendon, patellar, right, initial encounter [S86.811A])    Surgeons: Malachi Bauer DO Responsible Provider: Buster Ennis MD    Anesthesia Type: General ASA Status: 1            Anesthesia Type: General    Alma Phase I: Alma Score: 8    Alma Phase II: Alma Score: 10    Anesthesia Post Evaluation    Patient location during evaluation: PACU  Patient participation: complete - patient participated  Level of consciousness: awake  Airway patency: patent  Nausea & Vomiting: no nausea  Cardiovascular status: blood pressure returned to baseline  Respiratory status: acceptable  Hydration status: euvolemic  Comments: Multimodal analgesia pain management as indicated by procedure  Multimodal analgesia pain management approach  Pain management: adequate    No notable events documented.

## 2024-10-29 NOTE — H&P
Interval H&P Note    Pt Name: Alan Briscoe  MRN: [id number]  YOB: 2005  Date of evaluation: 10/29/2024      [x] I have reviewed in [facility] the Orthopedic Progress Note by Tabitha Zarco PA-C dated 10/25/24 attached below for the Interval History and Physical note.     [x] I have examined  Alan Briscoe, a 19 y.o. male presents to Preop nonweight bearing with use of crutches for his scheduled RIGHT KNEE PATELLA TENDON REPAIR (3080, SUPINE) by Malachi Bauer,  for Rupture, tendon, patellar, right, initial encounter. The patient denies new health changes, fever, chills, wheezing, cough, increased SOB, chest pain, open sores or wounds.    Allergies:  Patient has no known allergies.    Medications:    Prior to Admission medications    Medication Sig Start Date End Date Taking? Authorizing Provider   ibuprofen (ADVIL;MOTRIN) 800 MG tablet Take 1 tablet by mouth 3 times daily (with meals) 10/26/24  Yes Tabitha Zarco PA-C   acetaminophen (TYLENOL) 500 MG tablet Take 2 tablets by mouth every 6 hours as needed for Pain (max dose 4,000mg daily from all sources) 10/26/24  Yes Tabitha Zarco PA-C   aluminum & magnesium hydroxide-simethicone (MAALOX MAX) 400-400-40 MG/5ML SUSP Take 15 mLs by mouth every 6 hours as needed (Acid reflux) 3/27/24   Basim Resendez, WILLAM - CNP     Vital signs: BP (!) 148/84   Pulse (!) 105   Temp 97.5 °F (36.4 °C) (Temporal)   Resp 15   Ht 1.702 m (5' 7\")   Wt 86.2 kg (190 lb)   SpO2 100%   BMI 29.76 kg/m²       This is a 19 y.o. male who is pleasant, cooperative, alert and oriented x3, in no acute distress.    Heart: Heart sounds are normal.   mildly tachycardic asymptomatic on arrival to Preop (knee is painful)   regular rate and rhythm without murmur, gallop or rub.   Lungs: SpO2 100% Normal respiratory effort with equal expansion, good air exchange, unlabored and clear to auscultation without wheezes or rales bilaterally   Abdomen: soft, nontender, nondistended with bowel

## 2024-10-29 NOTE — ANESTHESIA PRE PROCEDURE
Anesthesia Plan      general     ASA 1                                   Buster Ennis MD   10/29/2024

## 2024-10-29 NOTE — OP NOTE
Operative Note      Patient: Alan Briscoe  YOB: 2005  MRN: [id number]    Date of Procedure: 10/29/2024    Pre-Op Diagnosis Codes:      * Rupture, tendon, patellar, right, initial encounter [S86.811A]    Post-Op Diagnosis: Right patellar tendon rupture       Procedure(s):  RIGHT KNEE PATELLA TENDON REPAIR WITH BIOINDUCTIVE IMPLANT augmentation    Surgeon(s):  Malachi Bauer DO    Assistant:   Resident: Seth Diaz DO; Jordan Alvarez DO    Anesthesia: General    Estimated Blood Loss (mL): 50    Complications: None    Specimens:   * No specimens in log *    Implants:  Implant Name Type Inv. Item Serial No.  Lot No. LRB No. Used Action   SYSTEM FIX LIGMNT AUG REP JUMPSTART DRSG SWIVELOCK - [id number]  SYSTEM FIX LIGMNT AUG REP JUMPSTART DRSG SWIVELOCK  [employer] [id number] Right 1 Implanted   IMPLANT BIOINDUCTIVE L BOV ACHILLES TEND W/ ARTHSCP DEL SYS - [id number]  IMPLANT BIOINDUCTIVE L BOV ACHILLES TEND W/ ARTHSCP DEL SYS  [employer]WD [id number] Right 1 Implanted         Drains: * No LDAs found *    Findings:  Infection Present At Time Of Surgery (PATOS) (choose all levels that have infection present):  No infection present  Other Findings: Right patellar tendon rupture    Detailed Description of Procedure:     Alan Briscoe is a 19-year-old male who presented to [facility] surgical department for repair of right patellar tendon rupture.  The patient sustained a patellar tendon rupture while playing basketball.  He has been found on clinical and radiographic evaluation to have significant tear of the right patellar tendon and he is unable to extend his knee against gravity.  As result it is felt he is best served with repair at this time.    Patient was identified preoperatively where consent was obtained, signed, placed on the chart.  The procedure was described.  His questions were answered.  All details of the procedure, as well as risks,

## 2024-10-29 NOTE — DISCHARGE INSTRUCTIONS
Orthopaedic Instructions:  -Weight bearing status: Weight bearing as tolerated with the right leg with hinge knee brace on locked in extension  -Do not remove Optifoam bandage (the large sealed \"Band-aid\"-like dressing) until your follow-up date in clinic. It is okay to shower with the Optifoam bandage. Should it fall off, replace with band-aids or gauze & tape until dry. It is still okay to shower if it falls off, but avoid baths and underwater submersion.  -Ice (20 minutes on and off 1 hour) and elevate above the level of the heart to reduce swelling and throbbing pain.  -Should urinate within 8 hours of surgery.  -Call the office or come to Emergency Room if signs of infection appear (hot, swollen, red, draining pus, fever).  -Take medications as prescribed.  -Wean off narcotics (percocet/norco) as soon as possible. Do not take tylenol if still taking narcotics.  -Follow up with Dr. Bauer in his office 11/13/24 at 10:15am. Call [phone] to schedule/confirm or with any questions/concerns.

## 2024-10-29 NOTE — BRIEF OP NOTE
Brief Postoperative Note      Patient: Alan Briscoe  YOB: 2005  MRN: [id number]    Date of Procedure: 10/29/2024    Pre-Op Diagnosis Codes:      * Rupture, tendon, patellar, right, initial encounter [S86.811A]    Post-Op Diagnosis: Right patellar tendon rupture       Procedure(s):  RIGHT KNEE PATELLA TENDON REPAIR WITH BIOINDUCTIVE IMPLANT augmentation    Surgeon(s):  Malachi Bauer DO    Assistant:  Resident: Seth Diaz DO; Jordan lAvarez DO    Anesthesia: General    Estimated Blood Loss (mL): 50    Complications: None    Specimens:   * No specimens in log *    Implants:  Implant Name Type Inv. Item Serial No.  Lot No. LRB No. Used Action   SYSTEM FIX LIGMNT AUG REP JUMPSTART DRSG SWIVELOCK - [id number]  SYSTEM FIX LIGMNT AUG REP JUMPSTART DRSG SWIVELOCK  [employer] [id number] Right 1 Implanted   IMPLANT BIOINDUCTIVE L BOV ACHILLES TEND W/ ARTHSCP DEL SYS - [id number]  IMPLANT BIOINDUCTIVE L BOV ACHILLES TEND W/ ARTHSCP DEL SYS  [employer]WD [id number] Right 1 Implanted         Drains: * No LDAs found *    Findings:  Infection Present At Time Of Surgery (PATOS) (choose all levels that have infection present):  No infection present  Other Findings: Right patellar tendon rupture    Electronically signed by Malachi Bauer DO on 10/29/2024 at 2:43 PM

## 2024-10-31 ENCOUNTER — TELEPHONE (OUTPATIENT)
Dept: ORTHOPEDIC SURGERY | Age: 19
End: 2024-10-31

## 2024-10-31 NOTE — TELEPHONE ENCOUNTER
Patient called in stating he is having pain from knee to shin.DOS 10/29/24 right knee patella tendon repair with bioinductive implant  He is wanting to know how fast pain will subside, is having a lot of pain in shin thinks it may be from incision but wants to check  Is having issues lifting foot off ground due to pain.  Please return call  Thank you

## 2024-10-31 NOTE — TELEPHONE ENCOUNTER
Patient was called and spoke to his father and patient on speaker phone. Patient complained that there is increased pain since surgery. Patient states he is taking the Percocet 3x daily and Ibuprofen 800mg 3x daily alternating times.  Patient conformed he is looked in extension in TROM. He's using Ice and elevating with ankle above knee and knee above heart. Using 3-4 pillows will help provide this. He conformed there is not pain in calf. Pain is only in knee and and right below knee in front. Patient voiced understanding and stated he will call if he has any other questions or concerns

## 2024-11-04 ENCOUNTER — TELEPHONE (OUTPATIENT)
Dept: ORTHOPEDIC SURGERY | Age: 19
End: 2024-11-04

## 2024-11-04 NOTE — TELEPHONE ENCOUNTER
Patient mother called  regarding post op instructions. Answered questions also informed her if patient needed refill on pain medication to call office- she stated that he had medication at this time.

## 2024-11-07 ENCOUNTER — APPOINTMENT (OUTPATIENT)
Dept: GENERAL RADIOLOGY | Age: 19
End: 2024-11-07
Payer: COMMERCIAL

## 2024-11-07 ENCOUNTER — HOSPITAL ENCOUNTER (EMERGENCY)
Age: 19
Discharge: HOME | End: 2024-11-08
Attending: EMERGENCY MEDICINE
Payer: COMMERCIAL

## 2024-11-07 DIAGNOSIS — R07.89 ATYPICAL CHEST PAIN: Primary | ICD-10-CM

## 2024-11-07 LAB
BASOPHILS # BLD: 0.03 K/UL (ref 0–0.2)
BASOPHILS NFR BLD: 0 % (ref 0–2)
D DIMER PPP FEU-MCNC: 3.71 UG/ML FEU (ref 0–0.59)
EOSINOPHIL # BLD: 0.05 K/UL (ref 0–0.44)
EOSINOPHILS RELATIVE PERCENT: 1 % (ref 1–4)
ERYTHROCYTE [DISTWIDTH] IN BLOOD BY AUTOMATED COUNT: 11.3 % (ref 11.8–14.4)
HCT VFR BLD AUTO: 45.5 % (ref 40.7–50.3)
HGB BLD-MCNC: 15.3 G/DL (ref 13–17)
IMM GRANULOCYTES # BLD AUTO: 0.03 K/UL (ref 0–0.3)
IMM GRANULOCYTES NFR BLD: 0 %
LYMPHOCYTES NFR BLD: 2.17 K/UL (ref 1.1–3.7)
LYMPHOCYTES RELATIVE PERCENT: 26 % (ref 25–45)
MCH RBC QN AUTO: 30.4 PG (ref 25.2–33.5)
MCHC RBC AUTO-ENTMCNC: 33.6 G/DL (ref 28.4–34.8)
MCV RBC AUTO: 90.3 FL (ref 82.6–102.9)
MONOCYTES NFR BLD: 0.68 K/UL (ref 0.1–1.4)
MONOCYTES NFR BLD: 8 % (ref 2–8)
NEUTROPHILS NFR BLD: 65 % (ref 34–64)
NEUTS SEG NFR BLD: 5.55 K/UL (ref 1.5–8.1)
NRBC BLD-RTO: 0 PER 100 WBC
PLATELET # BLD AUTO: 354 K/UL (ref 138–453)
PMV BLD AUTO: 8.9 FL (ref 8.1–13.5)
RBC # BLD AUTO: 5.04 M/UL (ref 4.21–5.77)
WBC OTHER # BLD: 8.5 K/UL (ref 4.5–13.5)

## 2024-11-07 PROCEDURE — 83690 ASSAY OF LIPASE: CPT

## 2024-11-07 PROCEDURE — 85025 COMPLETE CBC W/AUTO DIFF WBC: CPT

## 2024-11-07 PROCEDURE — 80048 BASIC METABOLIC PNL TOTAL CA: CPT

## 2024-11-07 PROCEDURE — 93005 ELECTROCARDIOGRAM TRACING: CPT | Performed by: EMERGENCY MEDICINE

## 2024-11-07 PROCEDURE — 80076 HEPATIC FUNCTION PANEL: CPT

## 2024-11-07 PROCEDURE — 71045 X-RAY EXAM CHEST 1 VIEW: CPT

## 2024-11-07 PROCEDURE — 99285 EMERGENCY DEPT VISIT HI MDM: CPT

## 2024-11-07 PROCEDURE — 83735 ASSAY OF MAGNESIUM: CPT

## 2024-11-07 PROCEDURE — 83605 ASSAY OF LACTIC ACID: CPT

## 2024-11-07 PROCEDURE — 85379 FIBRIN DEGRADATION QUANT: CPT

## 2024-11-08 ENCOUNTER — APPOINTMENT (OUTPATIENT)
Dept: CT IMAGING | Age: 19
End: 2024-11-08
Payer: COMMERCIAL

## 2024-11-08 VITALS
RESPIRATION RATE: 11 BRPM | HEART RATE: 95 BPM | BODY MASS INDEX: 29.51 KG/M2 | TEMPERATURE: 98.2 F | SYSTOLIC BLOOD PRESSURE: 109 MMHG | DIASTOLIC BLOOD PRESSURE: 74 MMHG | WEIGHT: 188 LBS | HEIGHT: 67 IN | OXYGEN SATURATION: 98 %

## 2024-11-08 LAB
ALBUMIN SERPL-MCNC: 4.5 G/DL (ref 3.5–5.2)
ALBUMIN/GLOB SERPL: 1.1 {RATIO} (ref 1–2.5)
ALP SERPL-CCNC: 83 U/L (ref 40–129)
ALT SERPL-CCNC: 43 U/L (ref 10–50)
ANION GAP SERPL CALCULATED.3IONS-SCNC: 15 MMOL/L
AST SERPL-CCNC: 23 U/L (ref 10–50)
BILIRUB DIRECT SERPL-MCNC: 0.2 MG/DL (ref 0–0.2)
BILIRUB INDIRECT SERPL-MCNC: 0.3 MG/DL
BILIRUB SERPL-MCNC: 0.5 MG/DL (ref 0–1.2)
BUN SERPL-MCNC: 20 MG/DL (ref 6–20)
CALCIUM SERPL-MCNC: 10 MG/DL (ref 8.6–10.4)
CHLORIDE SERPL-SCNC: 102 MMOL/L (ref 98–107)
CO2 SERPL-SCNC: 23 MMOL/L (ref 20–31)
CREAT SERPL-MCNC: 1.3 MG/DL (ref 0.7–1.2)
EKG ATRIAL RATE: 100 BPM
EKG P AXIS: 66 DEGREES
EKG P-R INTERVAL: 128 MS
EKG Q-T INTERVAL: 340 MS
EKG QRS DURATION: 78 MS
EKG QTC CALCULATION (BAZETT): 438 MS
EKG R AXIS: 88 DEGREES
EKG T AXIS: 41 DEGREES
EKG VENTRICULAR RATE: 100 BPM
GFR, ESTIMATED: 80 ML/MIN/1.73M2
GLUCOSE SERPL-MCNC: 87 MG/DL (ref 74–99)
LACTATE BLDV-SCNC: 1 MMOL/L (ref 0.5–1.9)
LACTATE BLDV-SCNC: 1.1 MMOL/L (ref 0.5–1.9)
LIPASE SERPL-CCNC: 26 U/L (ref 13–60)
MAGNESIUM SERPL-MCNC: 2 MG/DL (ref 1.6–2.6)
POTASSIUM SERPL-SCNC: 3.9 MMOL/L (ref 3.7–5.3)
PROT SERPL-MCNC: 8.4 G/DL (ref 6.6–8.7)
SODIUM SERPL-SCNC: 140 MMOL/L (ref 136–145)

## 2024-11-08 PROCEDURE — 2580000003 HC RX 258: Performed by: EMERGENCY MEDICINE

## 2024-11-08 PROCEDURE — 93010 ELECTROCARDIOGRAM REPORT: CPT | Performed by: INTERNAL MEDICINE

## 2024-11-08 PROCEDURE — 83605 ASSAY OF LACTIC ACID: CPT

## 2024-11-08 PROCEDURE — 6360000004 HC RX CONTRAST MEDICATION: Performed by: EMERGENCY MEDICINE

## 2024-11-08 PROCEDURE — 71260 CT THORAX DX C+: CPT

## 2024-11-08 RX ORDER — 0.9 % SODIUM CHLORIDE 0.9 %
80 INTRAVENOUS SOLUTION INTRAVENOUS ONCE
Status: COMPLETED | OUTPATIENT
Start: 2024-11-08 | End: 2024-11-08

## 2024-11-08 RX ORDER — SODIUM CHLORIDE 0.9 % (FLUSH) 0.9 %
10 SYRINGE (ML) INJECTION PRN
Status: DISCONTINUED | OUTPATIENT
Start: 2024-11-08 | End: 2024-11-08 | Stop reason: HOSPADM

## 2024-11-08 RX ORDER — IOPAMIDOL 755 MG/ML
75 INJECTION, SOLUTION INTRAVASCULAR
Status: COMPLETED | OUTPATIENT
Start: 2024-11-08 | End: 2024-11-08

## 2024-11-08 RX ADMIN — SODIUM CHLORIDE 80 ML: 0.9 INJECTION, SOLUTION INTRAVENOUS at 00:43

## 2024-11-08 RX ADMIN — SODIUM CHLORIDE, PRESERVATIVE FREE 10 ML: 5 INJECTION INTRAVENOUS at 00:42

## 2024-11-08 RX ADMIN — IOPAMIDOL 75 ML: 755 INJECTION, SOLUTION INTRAVENOUS at 00:42

## 2024-11-08 ASSESSMENT — LIFESTYLE VARIABLES
HOW OFTEN DO YOU HAVE A DRINK CONTAINING ALCOHOL: NEVER
HOW MANY STANDARD DRINKS CONTAINING ALCOHOL DO YOU HAVE ON A TYPICAL DAY: PATIENT DOES NOT DRINK

## 2024-11-08 ASSESSMENT — HEART SCORE: ECG: NORMAL

## 2024-11-08 NOTE — ED PROVIDER NOTES
EMERGENCY DEPARTMENT ENCOUNTER    Pt Name: Alan Briscoe  MRN: [id number]  Birthdate 2005  Date of evaluation: 11/7/24  CHIEF COMPLAINT       Chief Complaint   Patient presents with    Chest Pain     Px arrives complaining of midsternal chest pain present w SOB that initiated yesterday. Px states it has progressed into today. Px had knee surgery 9 days ago     HISTORY OF PRESENT ILLNESS   This is a 19-year-old male that presents the emergency department with complaints of chest pain.  Patient had a recent patellar tendon rupture and a surgical procedure to fix this.  Patient presents today with complaints of chest pain and palpitations as well as some epigastric abdominal pain.           REVIEW OF SYSTEMS     Review of Systems  PASTMEDICAL HISTORY   History reviewed. No pertinent past medical history.  Past Problem List  Patient Active Problem List   Diagnosis Code    Rupture, tendon, patellar, right, initial encounter S86.811A     SURGICAL HISTORY       Past Surgical History:   Procedure Laterality Date    PATELLAR TENDON REPAIR Right 10/29/2024    RIGHT KNEE PATELLA TENDON REPAIR WITH BIOINDUCTIVE IMPLANT performed by Malachi Bauer,  at [facility] OR     CURRENT MEDICATIONS       Previous Medications    ALUMINUM & MAGNESIUM HYDROXIDE-SIMETHICONE (MAALOX MAX) 400-400-40 MG/5ML SUSP    Take 15 mLs by mouth every 6 hours as needed (Acid reflux)    IBUPROFEN (ADVIL;MOTRIN) 800 MG TABLET    Take 1 tablet by mouth 3 times daily (with meals)     ALLERGIES     has No Known Allergies.  FAMILY HISTORY     has no family status information on file.      SOCIAL HISTORY       Social History     Tobacco Use    Smoking status: Never    Smokeless tobacco: Never   Vaping Use    Vaping status: Never Used   Substance Use Topics    Alcohol use: No    Drug use: No     PHYSICAL EXAM     INITIAL VITALS: /74   Pulse 95   Temp 98.2 °F (36.8 °C) (Oral)   Resp 11   Ht 1.702 m (5' 7\")   Wt 85.3 kg (188 lb)   SpO2 98%

## 2024-11-08 NOTE — ED NOTES
Pt c/o chest pain beginning at 1900 last night after eating.   Pt had surgery on 10/29 on right knee tare.   States pain medication \"messed with stomach\"   Pt has been coughing on and off and congested.  Denies SOB, denies head ache.   OTC pepcid and ibuprofen at home

## 2024-11-13 ENCOUNTER — OFFICE VISIT (OUTPATIENT)
Dept: ORTHOPEDIC SURGERY | Age: 19
End: 2024-11-13

## 2024-11-13 VITALS — WEIGHT: 188 LBS | BODY MASS INDEX: 29.51 KG/M2 | RESPIRATION RATE: 18 BRPM | HEIGHT: 67 IN

## 2024-11-13 DIAGNOSIS — S86.811D RUPTURE OF RIGHT PATELLAR TENDON, SUBSEQUENT ENCOUNTER: Primary | ICD-10-CM

## 2024-11-13 DIAGNOSIS — S86.811A RUPTURE, TENDON, PATELLAR, RIGHT, INITIAL ENCOUNTER: Primary | ICD-10-CM

## 2024-11-13 PROCEDURE — 99024 POSTOP FOLLOW-UP VISIT: CPT

## 2024-11-13 NOTE — PROGRESS NOTES
[facility] ORTHOPEDICS AND SPORTS MEDICINE  [address]  [place] [address]  Dept: [phone]  Dept Fax: [phone]        Postoperative follow-up note    Subjective:   Alan Briscoe is a 19 y.o. year old male who presents to our office today for postoperative followup regarding his   1. Rupture of right patellar tendon, subsequent encounter    .    Chief Complaint   Patient presents with    Knee Pain     R Knee Patellar Tendon Repair 10/29/24         DOS: 10/29/24 right knee patellar tendon repair  History of Present Illness  The patient is a 19-year-old male who presents for evaluation of knee pain. He is accompanied by his parents.    Two weeks post-surgery, he has not yet started physical therapy. He reports difficulty in lifting his leg independently and experiences discomfort when the brace digs into his knee, which occasionally disrupts his sleep. He has discontinued the use of Percocet due to constipation. He reports no numbness or tingling sensations. His pain level was at 5 prior to a slight drop in his leg while getting into a wheelchair, which increased to a 6 after the incident. He is currently using crutches for mobility, but does not think he is using these correctly.    He also reports discomfort in his lower back, which he attributes to prolonged sitting and lying down. He has a history of a hip flexor tear.    Additionally, he experienced chest pain post-operatively and went to the ER. A CT scan, EKG, and blood work were performed, and all results came back normal.    SOCIAL HISTORY  He goes to [employer] and is studying Exercise Science.         Review of Systems   Constitutional:  Negative for chills and fever.   Respiratory:  Negative for shortness of breath.    Cardiovascular:  Negative for chest pain.   Musculoskeletal:  Positive for arthralgias, gait problem and joint swelling.   Skin:  Negative for

## 2024-11-14 ASSESSMENT — ENCOUNTER SYMPTOMS
SHORTNESS OF BREATH: 0
COLOR CHANGE: 0

## 2024-11-15 ENCOUNTER — HOSPITAL ENCOUNTER (OUTPATIENT)
Dept: PHYSICAL THERAPY | Facility: CLINIC | Age: 19
Setting detail: THERAPIES SERIES
Discharge: HOME | End: 2024-11-15
Payer: COMMERCIAL

## 2024-11-15 PROCEDURE — 97161 PT EVAL LOW COMPLEX 20 MIN: CPT

## 2024-11-15 PROCEDURE — 97016 VASOPNEUMATIC DEVICE THERAPY: CPT

## 2024-11-15 PROCEDURE — 97530 THERAPEUTIC ACTIVITIES: CPT

## 2024-11-15 PROCEDURE — 97116 GAIT TRAINING THERAPY: CPT

## 2024-11-15 PROCEDURE — 97110 THERAPEUTIC EXERCISES: CPT

## 2024-11-19 ENCOUNTER — HOSPITAL ENCOUNTER (OUTPATIENT)
Dept: PHYSICAL THERAPY | Facility: CLINIC | Age: 19
Setting detail: THERAPIES SERIES
Discharge: HOME | End: 2024-11-19
Payer: COMMERCIAL

## 2024-11-19 PROCEDURE — 97110 THERAPEUTIC EXERCISES: CPT

## 2024-11-19 PROCEDURE — G0283 ELEC STIM OTHER THAN WOUND: HCPCS

## 2024-11-19 PROCEDURE — 97016 VASOPNEUMATIC DEVICE THERAPY: CPT

## 2024-11-19 NOTE — FLOWSHEET NOTE
[x] [facility]  Outpatient Rehabilitation &  Therapy  [address]   P: [phone]  F: [phone]      Physical Therapy Daily Treatment Note    Date:  2024  Patient Name:  Alan Briscoe    :  2005  MRN: [id number]  Physician: Dr. Bauer                                                   Insurance: [facility] (vs [facility])  Medical Diagnosis: Rupture of right patellar tendon, subsequent encounter (U84.969J)              Rehab Codes: M62.81 , R26.89 , M25.561, M25.661    Onset date: 10/29/24                                     Next 's appt.: 24   Visit# / total visits:      Cancels/No Shows: 0/0    Subjective:    Pain:  [] Yes  [x] No Location: R knee Pain Rating: (0-10 scale) 0/10  Pain altered Tx:  [x] No  [] Yes  Action:  Comments: Patient reports no increased soreness. Has completed the quad sets a couple times but it's difficult on the squishy couch.     Objective:  Modalities:   Precautions: s/p patellar tendon repair NWB RLE , brace locked in extension, no knee flexion until 4 week post-op: begin 0-30 (24)  6 week post-op: 0-60  8 week post-op: 0-90  10 week post-op: 0-120  12-16 weeks post-op: continue to utilize brace unlocked   Exercises:  Exercise    Reps/ Time Weight/ Level Comments   Calf Stretch  3x30\"   Long sitting with strap    Hamstring Stretch  3x30\"   Long sitting    Ankle pumps  HEP       Quad Sets 10'   With E-stim    Quad sets  20x10\"               Parallel Bars      OKC RLE  hip extension x20   With 2\" step under left leg    OKC RLE hip flexion  next     OKC RLE hip hikes x20                              Other:      Treatment location  Treatment Modality   R knee  Electrical Stim:    [] IFC     [] MFAC      [] HVPC                          Protocol:_Russian Estim- QUAD protocol x__10___min                          #Channels:  [x] 1        [] 2       [] Other:      Game Ready Vasocompression     Left   Right   Time   Temperature  Compression

## 2024-11-21 ENCOUNTER — HOSPITAL ENCOUNTER (OUTPATIENT)
Dept: PHYSICAL THERAPY | Facility: CLINIC | Age: 19
Setting detail: THERAPIES SERIES
Discharge: HOME | End: 2024-11-21
Payer: COMMERCIAL

## 2024-11-21 PROCEDURE — 97110 THERAPEUTIC EXERCISES: CPT

## 2024-11-21 PROCEDURE — 97016 VASOPNEUMATIC DEVICE THERAPY: CPT

## 2024-11-21 PROCEDURE — G0283 ELEC STIM OTHER THAN WOUND: HCPCS

## 2024-11-21 NOTE — FLOWSHEET NOTE
[x] [facility]  Outpatient Rehabilitation &  Therapy  [address]   P: [phone]  F: [phone]      Physical Therapy Daily Treatment Note    Date:  2024  Patient Name:  Alan Briscoe    :  2005  MRN: [id number]  Physician: Dr. Bauer                                                   Insurance: [facility] (vs [facility])  Medical Diagnosis: Rupture of right patellar tendon, subsequent encounter (D93.413X)              Rehab Codes: M62.81 , R26.89 , M25.561, M25.661    Onset date: 10/29/24                                     Next 's appt.: 24   Visit# / total visits: 3/20     Cancels/No Shows: 0/0    Subjective:    Pain:  [] Yes  [x] No Location: R knee Pain Rating: (0-10 scale) 0/10  Pain altered Tx:  [x] No  [] Yes  Action:  Comments: Patient arrives stating no issues to report and is complaint with HEP.     Objective:  Modalities:   Precautions: s/p patellar tendon repair WBAT RLE , brace locked in extension, using crutches, no knee flexion until 4 week post-op: begin 0-30 (24)  6 week post-op: 0-60  8 week post-op: 0-90  10 week post-op: 0-120  12-16 weeks post-op: continue to utilize brace unlocked   Exercises:  Exercise    Reps/ Time Weight/ Level Comments         E-stim Russian      Quad Sets x5'      SLR x5' Max A Brace on and locked into extension              Parallel Bars      Balance board  3' L2    Weight shifting 3'     Gait train w/ crutches 5'           Gym      SB gastroc stretch       OKC RLE  hip extension x10 [place]    OKC RLE hip abduction  x10 [place]                                  Other:      Treatment location  Treatment Modality   R knee  Electrical Stim:    [] IFC     [] MFAC      [] HVPC                          Protocol:_Russian Estim- QUAD protocol x__10___min                          #Channels:  [x] 1        [] 2       [] Other:      Game Ready Vasocompression     Left   Right   Time   Temperature  Compression   Comments    [] Shoulder

## 2024-11-22 DIAGNOSIS — S86.811A PATELLAR TENDON RUPTURE, RIGHT, INITIAL ENCOUNTER: ICD-10-CM

## 2024-11-22 RX ORDER — IBUPROFEN 800 MG/1
TABLET, FILM COATED ORAL
Qty: 90 TABLET | Refills: 0 | Status: SHIPPED | OUTPATIENT
Start: 2024-11-22

## 2024-11-26 ENCOUNTER — HOSPITAL ENCOUNTER (OUTPATIENT)
Dept: PHYSICAL THERAPY | Facility: CLINIC | Age: 19
Setting detail: THERAPIES SERIES
Discharge: HOME | End: 2024-11-26
Payer: COMMERCIAL

## 2024-11-26 PROCEDURE — 97016 VASOPNEUMATIC DEVICE THERAPY: CPT

## 2024-11-26 PROCEDURE — 97116 GAIT TRAINING THERAPY: CPT

## 2024-11-26 PROCEDURE — 97110 THERAPEUTIC EXERCISES: CPT

## 2024-11-26 NOTE — FLOWSHEET NOTE
Estim- QUAD protocol x__10__min                          #Channels:  [] 1        [] 2       [] Other:        Game Ready Vasocompression     Left   Right   Time   Temperature  Compression   Comments    [] Shoulder   [] Elbow  [] Hip   [x] Knee  [] Ankle   [] Boot   [] Low Back       []  [x]  [x] 10 mins   [] 15 mins   [] 20 mins     [x] 34 degrees   [] 36 degrees   [] 40 degrees   [] __ degrees  [] None   [] Low   [] Medium   [x] High                                   Specific Instructions for next treatment:  Continue with electrical stimulation next session, standing OKC exercises         Treatment Charges: Mins Units Time In/Out   []  Samoan-Estim      [x]  Ther Exercise 45 3 2:10pm-2:55pm   []  Neuromuscular Re-ed      [x]  Gait Training 10 1 2:00pm-2:10pm   []  Manual Therapy      []  Ther Activities      []  Aquatics      [x]  Vasocompression 10 1 2:55pm-3:05pm   []  Cervical Traction      []  Other      Total Billable time 65 4 2:00pm-3:05pm          Assessment: [x] Progressing toward goals. Allowed for 0-30 degrees knee ROM per protocol. Education provided on brace management and discussed continuing gait with brace locked. NuStep was occupied at end of session, but to initiate session with this next time to work toward ROM 0-30 degrees next session. Ambulated throughout session with use of single crutch. Printed additional HEP this date allowing for knee ROM 0-30 degrees.     [] No change.     [] Other:  [x] Patient would continue to benefit from skilled physical therapy services in order to: improve gait mechanics, improve quadriceps strengthening, improve patellar mobility, improve standing tolerance, and prepare the patient for functional knee ROM and strength following patellar tendon repair     STG/LTG  Goals  MET NOT MET ON-  GOING  Details   Date Addressed:            STG: To be met in 10 treatments            1. ? Pain: Decrease R knee pain levels to 3/10 with ADLs []  []  []      2. ? ROM: Increase

## 2024-11-29 ENCOUNTER — HOSPITAL ENCOUNTER (OUTPATIENT)
Dept: PHYSICAL THERAPY | Facility: CLINIC | Age: 19
Setting detail: THERAPIES SERIES
Discharge: HOME | End: 2024-11-29
Payer: COMMERCIAL

## 2024-11-29 PROCEDURE — 97110 THERAPEUTIC EXERCISES: CPT

## 2024-11-29 PROCEDURE — 97112 NEUROMUSCULAR REEDUCATION: CPT

## 2024-11-29 PROCEDURE — 97016 VASOPNEUMATIC DEVICE THERAPY: CPT

## 2024-11-29 NOTE — FLOWSHEET NOTE
[x] [facility]  Outpatient Rehabilitation &  Therapy  [address]   P: [phone]  F: [phone]      Physical Therapy Daily Treatment Note    Date:  2024  Patient Name:  Alan Briscoe    :  2005  MRN: [id number]  Physician: Dr. Bauer                                                   Insurance: [facility] (vs [facility])  Medical Diagnosis: Rupture of right patellar tendon, subsequent encounter (U33.415V)                Rehab Codes: M62.81 , R26.89 , M25.561, M25.661    Onset date: 10/29/24                                     Next 's appt.: 24   Visit# / total visits:      Cancels/No Shows: 0/0    Subjective:    Pain:  [] Yes  [x] No Location: R knee Pain Rating: (0-10 scale) 0/10  Pain altered Tx:  [x] No  [] Yes  Action:  Comments: Patient with increased pain on arrival. Brace is locked. Did not have a good set up at home to do the heel slides.     Objective:  Modalities:   Precautions: s/p patellar tendon repair WBAT RLE , brace locked in extension, using crutches, no knee flexion until 4 week post-op: begin 0-30 (24)  6 week post-op: 0-60 (10/10/24)  8 week post-op: 0-90  10 week post-op: 0-120  12-16 weeks post-op: continue to utilize brace unlocked   Exercises:  Exercise    Reps/ Time Weight/ Level Comments         NuStep 10'  L1 0-30 only                Quad Sets 10'   E-stim Russian   SLR x10 Mod-A    Hamstring stretch  3x30\"     Heel slides with strap supine x10 Mod-A 0-30 only    Seated heel slides    0-30 only    SAQ x10  Small black cylinder to refrain from +30 deg          Parallel Bars      Balance board   L2    Gait train w/ single crutch 5'           Gym      SB gastroc stretch  3x30\"     2-way hip  x10 [place] Bilat   Hip abduction/extension only          Resisted TKE  10x10\" [place]  RLE only   Holding 10 sec during both the bend and in TKE                 Other:      Treatment location  Treatment Modality   R knee  Electrical Stim:    [x] IFC

## 2024-12-03 ENCOUNTER — HOSPITAL ENCOUNTER (OUTPATIENT)
Dept: PHYSICAL THERAPY | Facility: CLINIC | Age: 19
Setting detail: THERAPIES SERIES
Discharge: HOME | End: 2024-12-03
Payer: COMMERCIAL

## 2024-12-03 PROCEDURE — 97112 NEUROMUSCULAR REEDUCATION: CPT

## 2024-12-03 PROCEDURE — 97110 THERAPEUTIC EXERCISES: CPT

## 2024-12-03 PROCEDURE — 97016 VASOPNEUMATIC DEVICE THERAPY: CPT

## 2024-12-03 NOTE — FLOWSHEET NOTE
[x] [facility]  Outpatient Rehabilitation &  Therapy  [address]   P: [phone]  F: [phone]      Physical Therapy Daily Treatment Note    Date:  12/3/2024  Patient Name:  Alan Briscoe    :  2005  MRN: [id number]  Physician: Dr. Bauer                                                   Insurance: [facility] (vs [facility])  Medical Diagnosis: Rupture of right patellar tendon, subsequent encounter (S61.329I)                Rehab Codes: M62.81 , R26.89 , M25.561, M25.661    Onset date: 10/29/24                                     Next 's appt.: 24   Visit# / total visits:      Cancels/No Shows: 0/0    Subjective:    Pain:  [] Yes  [x] No Location: R knee Pain Rating: (0-10 scale) 0/10  Pain altered Tx:  [x] No  [] Yes  Action:  Comments: Patient with increased pain on arrival. Brace is locked. Did not have a good set up at home to do the heel slides.     Objective:  Modalities:   Precautions: s/p patellar tendon repair WBAT RLE , brace locked in extension, using crutches, no knee flexion until 4 week post-op: begin 0-30 (24)  6 week post-op: 0-60 (12/10/24)  8 week post-op: 0-90  10 week post-op: 0-120  12-16 weeks post-op: continue to utilize brace unlocked   Exercises:  Exercise    Reps/ Time Weight/ Level Comments         NuStep 10'  L1 0-30 only                Quad Sets 10'   E-stim Russian   SLR x10 Mod-A    Hamstring stretch  3x30\"     Heel slides with strap supine x10 Mod-A 0-30 only    Seated heel slides    0-30 only    SAQ x5'  Small black cylinder in order to refrain from +30 deg   E-Stim Angolan         Parallel Bars      Balance board  3' L2    Gait train w/ single crutch 3'           Gym      SB gastroc stretch  3x30\"     4-way hip  x10 [place] Bilat    Resisted TKE  10x10\" [place]  RLE only   Holding 10 sec during both the bend and in TKE                 Other:      Treatment location  Treatment Modality   R knee  Electrical Stim:    [x] IFC     [] MFAC

## 2024-12-05 ENCOUNTER — HOSPITAL ENCOUNTER (OUTPATIENT)
Dept: PHYSICAL THERAPY | Facility: CLINIC | Age: 19
Setting detail: THERAPIES SERIES
Discharge: HOME | End: 2024-12-05
Payer: COMMERCIAL

## 2024-12-05 PROCEDURE — 97016 VASOPNEUMATIC DEVICE THERAPY: CPT

## 2024-12-05 PROCEDURE — 97112 NEUROMUSCULAR REEDUCATION: CPT

## 2024-12-05 PROCEDURE — 97110 THERAPEUTIC EXERCISES: CPT

## 2024-12-05 NOTE — FLOWSHEET NOTE
Strength: Patient to demonstrate at least 10 SLR without quadriceps lag to ease ADL's  []  []  []      4. Patient to demonstrate gait with good technique using crutches and maintaining NWB appropriately  []  []  []      5. Independent with Home Exercise Programs []  []  []        []  []  []      Date Addressed:            LTG: To be met in 20 treatments           1. Improve score on assessment tool Lower Extremity Functional Scale (LEFS) from 91% impairment to less than 80% impairment  []  []  []      2. Reduce pain levels to 0/10 or less with ADLs []  []  []      3. Increase MMT to 5/5 throughout to ease functional limitations and mobility  []  []  []                                  Patient goals: pain relief, walk normal     Rehab Potential:  [x] Good  [] Fair  [] Poor   Suggested Professional Referral:  [x] No  [] Yes:  Barriers to Goal Achievement:  [x] No  [] Yes:  Domestic Concerns:  [x] No  [] Yes:     Pt. Education:  [x] Yes  [] No  [x] Reviewed Prior HEP/Ed  Method of Education: [x] Verbal  [] Demo  [x] Written      Access Code: [email or web address]  URL: [email or web address]  Date: 11/15/2024  Prepared by: Marie Prado     Exercises  - Supine Ankle Pumps  - 2-3 x daily - 7 x weekly - 3 sets - 10 reps  - Long Sitting Quad Set  - 2-3 x daily - 7 x weekly - 3 sets - 10 reps - 10 (sec) hold  - Long Sitting Calf Stretch with Strap  - 2-3 x daily - 7 x weekly - 3 sets - 30 (sec) hold  - Seated Table Hamstring Stretch  - 2-3 x daily - 7 x weekly - 3 sets - 30 (sec) hold  - Long Sitting 4 Way Patellar Chama  - 1 x daily - 7 x weekly - 3 sets - 10 reps      Comprehension of Education:  [x] Verbalizes understanding.  [] Demonstrates understanding.  [x] Needs review.  [] Demonstrates/verbalizes HEP/Ed previously given.       Plan: [x] Continue current frequency toward long and short term goals.          Time In: 1:55pm              Time Out: 3:00pm       Electronically signed by:  GABE FENTON PTA

## 2024-12-10 ENCOUNTER — HOSPITAL ENCOUNTER (OUTPATIENT)
Dept: PHYSICAL THERAPY | Facility: CLINIC | Age: 19
Setting detail: THERAPIES SERIES
Discharge: HOME | End: 2024-12-10
Payer: COMMERCIAL

## 2024-12-10 PROCEDURE — 97016 VASOPNEUMATIC DEVICE THERAPY: CPT

## 2024-12-10 PROCEDURE — 97110 THERAPEUTIC EXERCISES: CPT

## 2024-12-10 NOTE — FLOWSHEET NOTE
[x] [facility]  Outpatient Rehabilitation &  Therapy  [address]   P: [phone]  F: [phone]      Physical Therapy Daily Treatment Note    Date:  12/10/2024  Patient Name:  Alan Briscoe    :  2005  MRN: [id number]  Physician: Dr. Bauer                                                   Insurance: [facility] (vs [facility])  Medical Diagnosis: Rupture of right patellar tendon, subsequent encounter (Q27.919P)                Rehab Codes: M62.81 , R26.89 , M25.561, M25.661    Onset date: 10/29/24                                     Next 's appt.: 24   Visit# / total visits:      Cancels/No Shows: 0/0    Subjective:    Pain:  [] Yes  [x] No Location: R knee Pain Rating: (0-10 scale) 0/10  Pain altered Tx:  [x] No  [] Yes  Action:  Comments: Patient arrived with single crutch stating pinching in the anterior/lateral knee.         Objective:  Modalities:   Precautions: s/p patellar tendon repair WBAT RLE , brace locked in extension, using crutches, no knee flexion until 4 week post-op: begin 0-30 (24)  6 week post-op: 0-60 (12/10/24)  8 week post-op: 0-90 (24)  10 week post-op: 0-120  12-16 weeks post-op: continue to utilize brace unlocked   Exercises:  Exercise    Reps/ Time Weight/ Level Comments         NuStep 10'  L1 0-60 only          SAQ x10     SLR x10 Active     Heel slides with strap  x10  0-60 only          Gym      SB gastroc stretch  3x30\"     HS stool stretch  3x30\"     4-way hip  x15 [place] Bilat    Resisted TKE RLE 10x10\" [place]     Balance board  5' L2    Total Gym squats 5' L20     Total Gym HR x20 L20                  Game Ready Vasocompression     Left   Right   Time   Temperature  Compression   Comments    [] Shoulder   [] Elbow  [] Hip   [x] Knee  [] Ankle   [] Boot   [] Low Back       []  [x]  [] 10 mins   [x] 15 mins   [] 20 mins     [x] 34 degrees   [] 36 degrees   [] 40 degrees   [] __ degrees  [] None   [] Low   [] Medium   [x] High

## 2024-12-11 ENCOUNTER — OFFICE VISIT (OUTPATIENT)
Dept: ORTHOPEDIC SURGERY | Age: 19
End: 2024-12-11

## 2024-12-11 VITALS — BODY MASS INDEX: 29.51 KG/M2 | RESPIRATION RATE: 18 BRPM | HEIGHT: 67 IN | WEIGHT: 188 LBS

## 2024-12-11 DIAGNOSIS — S86.811D RUPTURE OF RIGHT PATELLAR TENDON, SUBSEQUENT ENCOUNTER: Primary | ICD-10-CM

## 2024-12-11 PROCEDURE — 99024 POSTOP FOLLOW-UP VISIT: CPT

## 2024-12-11 ASSESSMENT — ENCOUNTER SYMPTOMS
NAUSEA: 0
COLOR CHANGE: 0
VOMITING: 0
SHORTNESS OF BREATH: 0

## 2024-12-11 NOTE — PROGRESS NOTES
Patient: Reyes Austin   : 1976   47 year old male       Surgeon: Jayna Samson MD  Assistant(s):  Carolina Ferreira PA-C   Anesthesia:  General + Local anesthetic (20 cc of 0.25% marcaine with epinephrine for subcutaneous tissue & combination of 4 mg injectable morphine, 1 mg toradol and 10 cc of 0.5% ropivicaine for intra-articular)  Anesthesiologist:  Anesthesiologist: Antonio Ruelas MD  Anesthesia Assistant: Cindy Lopez  Anesthesia Technician: Talia Bennett    PREOPERATIVE DIAGNOSIS:   Right shoulder biceps tenosynovitis, labral tear      POSTOPERATIVE DIAGNOSIS:   Right shoulder biceps tenosynovitis, degenerative circumferential labral tear, glenohumeral arthritis (grade 3-4 glenoid)     PROCEDURE:   Right  shoulder:  1. Arthroscopic extensive debridement   2. Mini-open subpectoral biceps tenodesis    CONDITION: Stable     ESTIMATED BLOOD LOSS: minimal    OR IMPLANTS: None    COMPLICATIONS: None immediate    HISTORY AND INDICATIONS:   Patient is a very pleasant 47 year old male with significant pain in the shoulder. MRI confirmed an intact rotator cuff and biceps tenosynovitis. The patient has failed conservative treatment and elected to have surgical correction. He understands the risks and complications of operative versus nonoperative treatment as well as risks specific to this procedure. This included but was not limited to pain, stiffness and need for revision surgery. The patient voiced understanding and consent was signed and placed in the chart.     DESCRIPTION OF PROCEDURE:   Patient was taken back to the operating room and placed under general anesthesia after getting a preoperative interscalene block. He was then placed in lateral decubitus position with all bony prominences padded.    An exam under anesthesia was performed:   Shoulder flexion: 170°   At 90° abduction, external rotation: 85°  At 90° abduction, internal rotation: 60°  The shoulder was then prepped and draped in    [facility] ORTHOPEDICS AND SPORTS MEDICINE  [address]  [facility] [place] [address]  Dept: [phone]  Dept Fax: [phone]        Postoperative follow-up note    Subjective:   Alan Briscoe is a 19 y.o. year old male who presents to our office today for postoperative followup regarding his   1. Rupture of right patellar tendon, subsequent encounter    .    Chief Complaint   Patient presents with    Knee Pain     R Patella Tendon Repair dos 10/29/24         Date of Surgery: 10/29/24    History of Present Illness  The patient is a 19-year-old male who presents for a post-op follow up of his right knee patellar tendon repair. He is now 6 weeks out from the date of the surgery. He is accompanied today by his mom.    He underwent a right knee patellar tendon repair on 10/29/2024, performed by Dr. Bauer. He has been utilizing a T scope brace, which was adjusted to 60 degrees of flexion yesterday. He attends physical therapy sessions twice a week, on Tuesdays and Thursdays, and reports significant improvement in his condition. He is not currently taking any pain medication. He continues to use a single crutch for ambulation, although he has begun to walk short distances without it, finding it slightly challenging. He has not attempted to move his knee without the brace. He is able to keep his knee straight and perform straight leg raises at home. He reports that his lower back pain has largely subsided, with only occasional episodes.  He states that the incision is well-healed and there is no erythema, drainage, or swelling.  He denies any fevers or chills.         Review of Systems   Constitutional:  Negative for chills and fever.   Respiratory:  Negative for shortness of breath.    Cardiovascular:  Negative for chest pain.   Gastrointestinal:  Negative for nausea and vomiting.   Musculoskeletal:  Positive for arthralgias, gait problem and  standard fashion. A timeout was done confirming that antibiotics were given.    A standard posterior superior portal was established followed by an anterior superior portal using needle localization.  A diagnostic glenohumeral arthroscopy was then performed. The findings are as follows:    The biceps tendon had partial-thickness tearing and synovitis. The tendon was tenotomized using electrocautery. The biceps anchor was debride. There were numerous small loose bodies (<5mm) that were removed with the suction and shaver. The anterior, posterior, and inferior labrum had extensive degenerative tearing. This was debrided to a stable margin. The inferior, middle, and superior glenohumeral ligaments were intact. The subscapularis tendon was intact. The articular cartilage of the glenoid revealed grade 3-4 degenerative change, primarily throughout the inferior margin. The articular cartilage of the humeral head mild grade 1 degenerative change. The articular insertion of the supraspinatus and infraspinatus had partial-thickness tearing. This was debrided to a stable margin.     A small 4 cm incision was created adjacent to the axillary fold. Blunt finger dissection was taken down through the deltopectoral interval. The pectoralis major tendon was identified. A right angle clamp was used to retrieve the long head of the biceps tendon.     Starting about 2 cm from the musculotendinous, the biceps tendon was whipstitched using the fiberloop suture. The excess tendon was excised. A 3.2 mm drill was used at the base of the bicipital groove to make a unicortical hole.   The suture was threaded through anchor in the standard fashion. The anchor was placed and biceps confirmed to be appropriately tensioned. This wound was copiously irrigated. The skin was closed with 2-0 Vicryl suture and a running Monocryl.     The portals were closed with 4-0 nylon. A sterile dressing was applied. The patient was returned to the supine  position, woken up, extubated and transferred to the PACU in stable condition.    The physician assistant was necessary throughout this case and there were no other appropriately skilled assistants available. I do not involve residents in my surgical procedures. During this procedure, the PA maintains the extremity in proper position, provides gentle soft tissue retraction, helps properly handle other surgical instruments and helps with wound closure and application of postoperative dressing. Knowledge of the subsequent steps of the procedure and experience with techniques are mandatory in order to provide the patient with the best care possible and completion of the procedure in a timely fashion to potentially prevent complications.

## 2024-12-11 NOTE — PATIENT INSTRUCTIONS
PATIENTIQ:  PatientIQ helps [facility] stay in touch with you to know how you're feeling, and provides education and care instructions to you at various time points.   Your answers help your care team track your progress to provide the best care possible. PatientIQ will contact you pre-op and post-op via email or text with:  Educational Videos and Care Instructions  Questionnaires About How You're Feeling    Your participation provides you valuable education and helps [facility] continue to provide quality care to all patients. Thank you

## 2024-12-12 ENCOUNTER — HOSPITAL ENCOUNTER (OUTPATIENT)
Dept: PHYSICAL THERAPY | Facility: CLINIC | Age: 19
Setting detail: THERAPIES SERIES
Discharge: HOME | End: 2024-12-12
Payer: COMMERCIAL

## 2024-12-12 PROCEDURE — 97016 VASOPNEUMATIC DEVICE THERAPY: CPT

## 2024-12-12 PROCEDURE — 97110 THERAPEUTIC EXERCISES: CPT

## 2024-12-12 NOTE — FLOWSHEET NOTE
Medium   [x] High                                   Specific Instructions for next treatment: quad strengthening/ knee ROM       Treatment Charges: Mins Units Time In/Out   []  Ugandan-Estim      [x]  Ther Exercise 45 3 2:10pm-2:55pm   []  Neuromuscular Re-ed      []  Gait Training      []  Manual Therapy      []  Ther Activities      []  Aquatics      [x]  Vasocompression 15 1 2:55pm-3:10pm   []  Cervical Traction      []  Other      Total Billable time 60 4 2:10pm-3:10pm          Assessment: [x] Progressing toward goals. Continued strength and stability progressions per log. Patient ambulating around clinic with no AD with no issues noted per patient. Able to progress ROM this date to 60 degrees post heel slides. Will continue to advance SL stability and ROM per protocol.    [] No change.     [] Other:  [x] Patient would continue to benefit from skilled physical therapy services in order to: improve gait mechanics, improve quadriceps strengthening, improve patellar mobility, improve standing tolerance, and prepare the patient for functional knee ROM and strength following patellar tendon repair     Goals  MET NOT MET ON-  GOING  Details   Date Addressed:            STG: To be met in 10 treatments            1. ? Pain: Decrease R knee pain levels to 3/10 with ADLs []  []  []      2. ? ROM: Increase R knee AROM to at least 0-90 deg (when protocol allows) to reduce difficulty with ADLs []  []  []      3. ? Strength: Patient to demonstrate at least 10 SLR without quadriceps lag to ease ADL's  []  []  []      4. Patient to demonstrate gait with good technique using crutches and maintaining NWB appropriately  []  []  []      5. Independent with Home Exercise Programs []  []  []        []  []  []      Date Addressed:            LTG: To be met in 20 treatments           1. Improve score on assessment tool Lower Extremity Functional Scale (LEFS) from 91% impairment to less than 80% impairment  []  []  []      2. Reduce

## 2024-12-17 ENCOUNTER — HOSPITAL ENCOUNTER (OUTPATIENT)
Dept: PHYSICAL THERAPY | Facility: CLINIC | Age: 19
Setting detail: THERAPIES SERIES
Discharge: HOME | End: 2024-12-17
Payer: COMMERCIAL

## 2024-12-17 PROCEDURE — 97110 THERAPEUTIC EXERCISES: CPT

## 2024-12-17 PROCEDURE — 97016 VASOPNEUMATIC DEVICE THERAPY: CPT

## 2024-12-17 NOTE — FLOWSHEET NOTE
[x] [facility]  Outpatient Rehabilitation &  Therapy  [address]   P: [phone]  F: [phone]      Physical Therapy Daily Treatment Note    Date:  2024  Patient Name:  Alan Briscoe    :  2005  MRN: [id number]  Physician: Dr. Bauer                                                   Insurance: [facility] (vs [facility])  Medical Diagnosis: Rupture of right patellar tendon, subsequent encounter (S56.087I)                Rehab Codes: M62.81 , R26.89 , M25.561, M25.661    Onset date: 10/29/24                                     Next 's appt.: 24   Visit# / total visits: 10/20     Cancels/No Shows: 0/0    Subjective:    Pain:  [] Yes  [x] No Location: R knee Pain Rating: (0-10 scale) 0/10  Pain altered Tx:  [x] No  [] Yes  Action:  Comments: Patient arrived with single crutch in hand, but not using it for ambulation upon entrance to clinic.     Objective:  Modalities:   Precautions: s/p patellar tendon repair WBAT RLE , brace locked in extension, using crutches, no knee flexion until 4 week post-op: begin 0-30 (24)  6 week post-op: 0-60 (12/10/24)  8 week post-op: 0-90 (24)  10 week post-op: 0-120  12-16 weeks post-op: continue to utilize brace unlocked   Exercises:  Exercise    Reps/ Time Weight/ Level Comments         NuStep 10'  L1 0-60 only   Mid-session following gym/parallel bar activities          SAQ x20  Bolster and towel allowing approx 55 deg knee flex    SLR 2x10 Active     Heel slides with strap  HEP  0-60 only          Gym      SB gastroc stretch       HS stool stretch       4-way hip  x15  Green Bilat    Resisted TKE RLE 10x10\"  Green    Balance board    L2    Total Gym squats 3'  L20     Total Gym HR x20  L20    Mini-squats to mat table x20   Table at mid-thigh height          Parallel bars       Mini lunges  2x10 ea      Heel/toe raises x20          Game Ready Vasocompression     Left   Right   Time   Temperature  Compression   Comments    []

## 2024-12-19 ENCOUNTER — HOSPITAL ENCOUNTER (OUTPATIENT)
Dept: PHYSICAL THERAPY | Facility: CLINIC | Age: 19
Setting detail: THERAPIES SERIES
Discharge: HOME | End: 2024-12-19
Payer: COMMERCIAL

## 2024-12-19 PROCEDURE — 97016 VASOPNEUMATIC DEVICE THERAPY: CPT

## 2024-12-19 PROCEDURE — 97110 THERAPEUTIC EXERCISES: CPT

## 2024-12-19 PROCEDURE — 97140 MANUAL THERAPY 1/> REGIONS: CPT

## 2024-12-19 NOTE — FLOWSHEET NOTE
[x] [facility]  Outpatient Rehabilitation &  Therapy  [address]   P: [phone]  F: [phone]      Physical Therapy Daily Treatment Note    Date:  2024  Patient Name:  Alan Briscoe    :  2005  MRN: [id number]  Physician: Dr. Bauer                                                   Insurance: [facility] (vs [facility])  Medical Diagnosis: Rupture of right patellar tendon, subsequent encounter (C56.333M)                Rehab Codes: M62.81 , R26.89 , M25.561, M25.661    Onset date: 10/29/24                                     Next 's appt.: 24   Visit# / total visits: 10/20     Cancels/No Shows: 0/0    Subjective:    Pain:  [] Yes  [x] No Location: R knee Pain Rating: (0-10 scale) 0/10  Pain altered Tx:  [x] No  [] Yes  Action:  Comments: Patient arrived with single crutch in hand, but not using it for ambulation upon entrance to clinic.     Objective:  Modalities:   Precautions:   6 week post-op: 0-60 (12/10/24)- brace unlocked during gait   8 week post-op: 0-90 (24)  10 week post-op: 0-120  12-16 weeks post-op: continue to utilize brace unlocked   Exercises:  Exercise    Reps/ Time Weight/ Level Comments           Bike rocking 10'    x          SAQ x20  Bolster and towel allowing approx 55 deg knee flex     SLR 2x10 Active   x   Heel slides with strap  x20  0-60 only            Gym       SB gastroc stretch        HS stool stretch        4-way hip  x15  Green Bilat     Resisted TKE RLE 10x10\"  Green     Balance board    L2     Total Gym squats 5'  L20   x   Total Gym HR x20  L20     Mini-squats to mat table x20   Table at mid-thigh height                 Game Ready Vasocompression     Left   Right   Time   Temperature  Compression   Comments    [] Shoulder   [] Elbow  [] Hip   [x] Knee  [] Ankle   [] Boot   [] Low Back       []  [x]  [x] 10 mins   [] 15 mins   [] 20 mins     [x] 34 degrees   [] 36 degrees   [] 40 degrees   [] __ degrees  [] None   [] Low   []

## 2024-12-24 ENCOUNTER — HOSPITAL ENCOUNTER (OUTPATIENT)
Dept: PHYSICAL THERAPY | Facility: CLINIC | Age: 19
Setting detail: THERAPIES SERIES
Discharge: HOME | End: 2024-12-24
Payer: COMMERCIAL

## 2024-12-24 PROCEDURE — 97110 THERAPEUTIC EXERCISES: CPT

## 2024-12-24 PROCEDURE — 97140 MANUAL THERAPY 1/> REGIONS: CPT

## 2024-12-24 PROCEDURE — 97016 VASOPNEUMATIC DEVICE THERAPY: CPT

## 2024-12-24 NOTE — FLOWSHEET NOTE
[x] [facility]  Outpatient Rehabilitation &  Therapy  [address]   P: [phone]  F: [phone]        Physical Therapy Daily Treatment Note    Date:  2024  Patient Name:  Alan Briscoe    :  2005  MRN: [id number]  Physician: Dr. Bauer                                                   Insurance: [facility] (vs [facility])  Medical Diagnosis: Rupture of right patellar tendon, subsequent encounter (K54.962C)                Rehab Codes: M62.81 , R26.89 , M25.561, M25.661    Onset date: 10/29/24                                     Next 's appt.: 24   Visit# / total visits:      Cancels/No Shows: 0/0        Subjective:    Pain:  [] Yes  [x] No Location: R knee Pain Rating: (0-10 scale) 0/10  Pain altered Tx:  [x] No  [] Yes  Action:  Comments: Patient arrived with brace unlocked, 90 degrees. No assistive device.         Objective:  Precautions:   6 week post-op: 0-60 (12/10/24)- brace unlocked during gait   8 week post-op: 0-90 (24)  10 week post-op: 0-120  12-16 weeks post-op: continue to utilize brace unlocked   Exercise    Reps/ Time Weight/ Level Comments         Bike   10'            SLR  2x10     Heel slides with strap   x20  0-90 only    Prone hang             Slant board gastroc stretch   30\"x3     HS stool stretch   30\"x3     Quad step stretch   30\"x3     4-way hip   x15  Green Bilat    Resisted TKE RLE  10x10\"  Green    Balance board   L2x5'     Total Gym squats     L20x20      Total Gym Heel Raises    L20x20     Mini-squats to mat table    x10  Table at mid-thigh height    Tap down lateral   4\"   2x5     Power strides  6\"   2x10                                        Game Ready Vasocompression     Left   Right   Time   Temperature  Compression   Comments    [] Shoulder   [] Elbow  [] Hip   [x] Knee  [] Ankle   [] Boot   [] Low Back       []  [x]  [x] 10 mins   [] 15 mins   [] 20 mins     [x] 34 degrees   [] 36 degrees   [] 40 degrees   [] __ degrees  []

## 2024-12-26 ENCOUNTER — HOSPITAL ENCOUNTER (OUTPATIENT)
Dept: PHYSICAL THERAPY | Facility: CLINIC | Age: 19
Setting detail: THERAPIES SERIES
Discharge: HOME | End: 2024-12-26
Payer: COMMERCIAL

## 2024-12-26 NOTE — FLOWSHEET NOTE
[] [facility]  Outpatient Rehabilitation &  Therapy  [address]  [phone]  [phone] [] [facility]  Outpatient Rehabilitation &  Therapy  [address]  P: [phone]  F: [phone] [] [facility]  Outpatient Rehabilitation &  Therapy  [address]  P: [phone]  F: [phone] [] [facility]  Outpatient Rehabilitation &  Therapy  [address]  [phone]  [phone] [x] [facility]  Outpatient Rehabilitation &  Therapy  [address]   P: [phone]  F: [phone]  [] [facility]  Outpatient Rehabilitation &  Therapy  [address]  P: [phone]  F: [phone] [] [facility]  Outpatient Rehabilitation &  Therapy  [address]  P: [phone]  F: [phone] [] [facility]  Outpatient Rehabilitation &  Therapy  [address]  P: [phone]  F: [phone] [] [facility]  Outpatient Rehabilitation &  Therapy  [address]  P: [phone]  F: [phone]  [] [facility] Outpatient Rehabilitation &  Therapy  [address]  P: [phone]  F: [phone]     Therapy Cancel/No Show note    Date: 2024  Patient: Alan Briscoe  : 2005  MRN: [id number]    Cancels/No Shows to date: 1    For today's appointment patient:    [x]  Cancelled    [] Rescheduled appointment    [] No-show     Reason given by patient:    [x]  Patient ill    []  Conflicting appointment    [] No transportation      [] Conflict with work    [] No reason given    [] Weather related    [] COVID-19    [] Other:      Comments:        [x] Next appointment was confirmed    Electronically signed by: GABE FENTON PTA

## 2024-12-31 ENCOUNTER — HOSPITAL ENCOUNTER (OUTPATIENT)
Dept: PHYSICAL THERAPY | Facility: CLINIC | Age: 19
Setting detail: THERAPIES SERIES
Discharge: HOME | End: 2024-12-31
Payer: COMMERCIAL

## 2024-12-31 NOTE — FLOWSHEET NOTE
[] [facility]  Outpatient Rehabilitation &  Therapy  [address]  [phone]  [phone] [] [facility]  Outpatient Rehabilitation &  Therapy  [address]  P: [phone]  F: [phone] [] [facility]  Outpatient Rehabilitation &  Therapy  [address]  P: [phone]  F: [phone] [] [facility]  Outpatient Rehabilitation &  Therapy  [address]  [phone]  [phone] [x] [facility]  Outpatient Rehabilitation &  Therapy  [address]   P: [phone]  F: [phone]  [] [facility]  Outpatient Rehabilitation &  Therapy  [address]  P: [phone]  F: [phone] [] [facility]  Outpatient Rehabilitation &  Therapy  [address]  P: [phone]  F: [phone] [] [facility]  Outpatient Rehabilitation &  Therapy  [address]  P: [phone]  F: [phone] [] [facility]  Outpatient Rehabilitation &  Therapy  [address]  P: [phone]  F: [phone]  [] [facility] Outpatient Rehabilitation &  Therapy  [address]  P: [phone]  F: [phone]     Therapy Cancel/No Show note    Date: 2024  Patient: Alan Briscoe  : 2005  MRN: [id number]    Cancels/No Shows to date: 1    For today's appointment patient:    [x]  Cancelled    [] Rescheduled appointment    [] No-show     Reason given by patient:    [x]  Patient ill    []  Conflicting appointment    [] No transportation      [] Conflict with work    [] No reason given    [] Weather related    [] COVID-19    [] Other:      Comments:        [x] Next appointment was confirmed    Electronically signed by: Marie Prado PT

## 2025-01-07 ENCOUNTER — HOSPITAL ENCOUNTER (OUTPATIENT)
Dept: PHYSICAL THERAPY | Facility: CLINIC | Age: 20
Setting detail: THERAPIES SERIES
Discharge: HOME | End: 2025-01-07
Payer: COMMERCIAL

## 2025-01-07 PROCEDURE — 97016 VASOPNEUMATIC DEVICE THERAPY: CPT

## 2025-01-07 PROCEDURE — 97110 THERAPEUTIC EXERCISES: CPT

## 2025-01-07 NOTE — FLOWSHEET NOTE
mins     [x] 34 degrees   [] 36 degrees   [] 40 degrees   [] __ degrees  [] None   [] Low   [] Medium   [x] High        Myofascial Restrictions  Location  R/L Treatment  Tools Notes   Lower Crossed    [] Psoas   [] Iliacus [] Right  [] Left [] Direct  [] Indirect [] Hands-On  [] IASTM  [] Hypervolt     [] Quad [x] Right  [] Left [x] Direct  [] Indirect [] Hands-On  [x] IASTM  [] Hypervolt     Tempered Roller 50#      [] Other: articularis  [x] Right  [] Left [x] Direct  [] Indirect [x] Hands-On  [] IASTM  [] Hypervolt DI     [] Other: Patellar glides all planes RLE    [x] Right  [] Left   Patellar glides, instructed in self-mobilization       Direct or Indirect release  IASTM= Instrument assisted soft tissue mobilization      RLE Knee ROM 1/7/25   AROM 96, PROM 106                            Specific Instructions for next treatment: quad strengthening/ knee ROM       Treatment Charges: Mins Units Time In/Out   []  Slovenian-Estim      [x]  Ther Exercise 62 4 [id number]   []  Neuromuscular Re-ed      [x]  Manual Therapy      [x]  Vasocompression 10 1 [id number]   []  Other      Total Billable time 72 4 [phone]   Charged bike warm-up as there ex to address ROM deficits      Assessment: [x] Progressing toward goals. Patient was able to make full revolutions on the upright bike today after about 2 minutes of warm up revolutions. Progressed standing strength program, including eccentric quadriceps strength. Despite patients last formal PT appointment being 12/24/24, his knee ROM is 0-106 today, suggesting compliance with ROM HEP. He continues to lack end range quadriceps strength, resumed quadriceps setting at end of session to ensure proper patellar translation.     [] No change.     [] Other:  [x] Patient would continue to benefit from skilled physical therapy services in order to: improve gait mechanics, improve quadriceps strengthening, improve patellar mobility, improve standing tolerance, and prepare the

## 2025-01-09 ENCOUNTER — HOSPITAL ENCOUNTER (OUTPATIENT)
Dept: PHYSICAL THERAPY | Facility: CLINIC | Age: 20
Setting detail: THERAPIES SERIES
Discharge: HOME | End: 2025-01-09
Payer: COMMERCIAL

## 2025-01-09 PROCEDURE — 97016 VASOPNEUMATIC DEVICE THERAPY: CPT

## 2025-01-09 PROCEDURE — 97110 THERAPEUTIC EXERCISES: CPT

## 2025-01-09 NOTE — FLOWSHEET NOTE
difficulty with ADLs []  [x]  []  0-60 degrees ROM as of 12/17/24 , to progress to 90 degrees on 12/31/24 per protocol    3. ? Strength: Patient to demonstrate at least 10 SLR without quadriceps lag to ease ADL's  []  [x]  []  Small amount of quadriceps lag present     4. Patient to demonstrate gait with good technique using crutches and maintaining NWB appropriately  [x]  []  []      5. Independent with Home Exercise Programs [x]  []  []        []  []  []      Date Addressed:            LTG: To be met in 20 treatments           1. Improve score on assessment tool Lower Extremity Functional Scale (LEFS) from 91% impairment to less than 80% impairment  []  []  []      2. Reduce pain levels to 0/10 or less with ADLs []  []  []      3. Increase MMT to 5/5 throughout to ease functional limitations and mobility  []  []  []                                  Patient goals: pain relief, walk normal       Pt. Education:  [x] Yes  [] No  [x] Reviewed Prior HEP/Ed  Method of Education: [x] Verbal  [x] Demo  [x] Written      Access Code: [email or web address]  URL: [email or web address]  Date: 12/17/2024  Prepared by: Marie Prado    Exercises  - Long Sitting Quad Set  - 2-3 x daily - 7 x weekly - 3 sets - 10 reps - 10 (sec) hold  - Long Sitting Calf Stretch with Strap  - 2-3 x daily - 7 x weekly - 3 sets - 30 (sec) hold  - Seated Table Hamstring Stretch  - 2-3 x daily - 7 x weekly - 3 sets - 30 (sec) hold  - Long Sitting 4 Way Patellar [place]  - 1 x daily - 7 x weekly - 3 sets - 10 reps  - Supine Active Straight Leg Raise  - 1 x daily - 7 x weekly - 3 sets - 10 reps  - Mini Squat with Chair  - 1 x daily - 7 x weekly - 3 sets - 10 reps  - Mini Lunge  - 1 x daily - 7 x weekly - 3 sets - 10 reps  - Supine Knee Extension Strengthening  - 1 x daily - 7 x weekly - 3 sets - 10 reps    Comprehension of Education:  [x] Verbalizes understanding.  [] Demonstrates understanding.  [x] Needs review.  [] Demonstrates/verbalizes HEP/Ed

## 2025-01-14 ENCOUNTER — HOSPITAL ENCOUNTER (OUTPATIENT)
Dept: PHYSICAL THERAPY | Facility: CLINIC | Age: 20
Setting detail: THERAPIES SERIES
Discharge: HOME | End: 2025-01-14
Payer: COMMERCIAL

## 2025-01-14 PROCEDURE — 97016 VASOPNEUMATIC DEVICE THERAPY: CPT

## 2025-01-14 PROCEDURE — 97110 THERAPEUTIC EXERCISES: CPT

## 2025-01-14 NOTE — FLOWSHEET NOTE
[] Hands-On  [] IASTM  [] Hypervolt     [] Quad [x] Right  [x] Left [x] Direct  [] Indirect [] Hands-On  [x] IASTM  [] Hypervolt Tempered Roller 50# (long sit on floor)      [] Other: articularis  [x] Right  [] Left [x] Direct  [] Indirect [x] Hands-On  [] IASTM  [] Hypervolt DI     [] Other: Patellar glides all planes RLE    [x] Right  [] Left   Patellar glides, instructed in self-mobilization       Direct or Indirect release  IASTM= Instrument assisted soft tissue mobilization         RLE Knee ROM 1/7/25   AROM 96, PROM 106                            Specific Instructions for next treatment: quad strengthening/ knee ROM       Treatment Charges: Mins Units Time In/Out   []  Italian-Estim      [x]  Ther Exercise 50 3 2:00pm-3:00pm   []  Neuromuscular Re-ed      []  Manual Therapy      [x]  Vasocompression 15 1 3:00pm-3:15pm   []  Other      Total Billable time 65 4 3:00pm-3:15pm       Assessment: [x] Progressing toward goals. Progressed eccentric quadriceps strengthening exercises with fatigue, no pain. Ended with vasocompression to decrease swelling and pain. Will continue with gym program independently and progress strength and stability next visit.               [] No change.     [] Other:  [x] Patient would continue to benefit from skilled physical therapy services in order to: improve gait mechanics, improve quadriceps strengthening, improve patellar mobility, improve standing tolerance, and prepare the patient for functional knee ROM and strength following patellar tendon repair     Goals  MET NOT MET ON-  GOING  Details   Date Addressed: 12/17/24           STG: To be met in 10 treatments            1. ? Pain: Decrease R knee pain levels to 3/10 with ADLs [x]  []  []      2. ? ROM: Increase R knee AROM to at least 0-90 deg (when protocol allows) to reduce difficulty with ADLs []  [x]  []  0-60 degrees ROM as of 12/17/24 , to progress to 90 degrees on 12/31/24 per protocol    3. ? Strength: Patient to

## 2025-01-16 ENCOUNTER — HOSPITAL ENCOUNTER (OUTPATIENT)
Dept: PHYSICAL THERAPY | Facility: CLINIC | Age: 20
Setting detail: THERAPIES SERIES
Discharge: HOME | End: 2025-01-16
Payer: COMMERCIAL

## 2025-01-16 PROCEDURE — 97016 VASOPNEUMATIC DEVICE THERAPY: CPT

## 2025-01-16 PROCEDURE — 97110 THERAPEUTIC EXERCISES: CPT

## 2025-01-16 NOTE — FLOWSHEET NOTE
Reviewed Prior HEP/Ed  Method of Education: [x] Verbal  [x] Demo  [x] Written      Access Code: [email or web address]  URL: [email or web address]  Date: 12/17/2024  Prepared by: Marie Prado    Exercises  - Long Sitting Quad Set  - 2-3 x daily - 7 x weekly - 3 sets - 10 reps - 10 (sec) hold  - Long Sitting Calf Stretch with Strap  - 2-3 x daily - 7 x weekly - 3 sets - 30 (sec) hold  - Seated Table Hamstring Stretch  - 2-3 x daily - 7 x weekly - 3 sets - 30 (sec) hold  - Long Sitting 4 Way Patellar [place]  - 1 x daily - 7 x weekly - 3 sets - 10 reps  - Supine Active Straight Leg Raise  - 1 x daily - 7 x weekly - 3 sets - 10 reps  - Mini Squat with Chair  - 1 x daily - 7 x weekly - 3 sets - 10 reps  - Mini Lunge  - 1 x daily - 7 x weekly - 3 sets - 10 reps  - Supine Knee Extension Strengthening  - 1 x daily - 7 x weekly - 3 sets - 10 reps    Comprehension of Education:  [x] Verbalizes understanding.  [] Demonstrates understanding.  [x] Needs review.  [] Demonstrates/verbalizes HEP/Ed previously given.       Plan: [x] Continue current frequency toward long and short term goals.          Time In: 2:00pm       Time Out: 3:10pm      Electronically signed by:  Sintia Finn PTA

## 2025-01-21 ENCOUNTER — HOSPITAL ENCOUNTER (OUTPATIENT)
Dept: PHYSICAL THERAPY | Facility: CLINIC | Age: 20
Setting detail: THERAPIES SERIES
Discharge: HOME | End: 2025-01-21
Payer: COMMERCIAL

## 2025-01-21 PROCEDURE — 97110 THERAPEUTIC EXERCISES: CPT

## 2025-01-21 PROCEDURE — 97016 VASOPNEUMATIC DEVICE THERAPY: CPT

## 2025-01-21 NOTE — FLOWSHEET NOTE
[x] [facility]  Outpatient Rehabilitation &  Therapy  [address]   P: [phone]  F: [phone]        Physical Therapy Daily Treatment Note    Date:  2025  Patient Name:  Alan Briscoe    :  2005  MRN: [id number]  Physician: Dr. Bauer                                                   Insurance: [facility] (vs [facility])  Medical Diagnosis: Rupture of right patellar tendon, subsequent encounter (E43.869O)                Rehab Codes: M62.81 , R26.89 , M25.561, M25.661    Onset date: 10/29/24                                     Next 's appt.: 24   Visit# / total visits:      Cancels/No Shows: 0/0    Subjective:    Pain:  [] Yes  [x] No Location: R knee Pain Rating: (0-10 scale) 0/10  Pain altered Tx:  [x] No  [] Yes  Action:  Comments: Patient arrived stating no issues to report. He has been going to his gym and riding the bike. Has a follow up with ortho next week.     Objective:  Precautions:   12-16 weeks post-op: continue to utilize brace unlocked - starting (25)  Exercise    Reps/ Time Weight/ Level Comments         Elliptical   10'            Slant board gastroc stretch   30\"x3     HS stool stretch   30\"x3           4-way hip   HEP Blue Bilat    Balance board       Mat tap hold  10x10\"  2\" step under the LLE    Tap down lateral   2x10 6\"    Power strides  2x10 6\"    Forward taps   2x20 4\"    Lunges- static   2x10     Lateral Monster walks   Green    TRX squats  2x10     SLS Rebounder foam x20     Total Gym Single leg squats  2x10     Wall sits with heel raises  3x10     Calf Raises leaning onto the wall  x30     Sidelying hip abduction  x20 Lime     Bridges  x20 Lime     SLR  x20          Game Ready Vasocompression     Left   Right   Time   Temperature  Compression   Comments    [] Shoulder   [] Elbow  [] Hip   [x] Knee  [] Ankle   [] Boot   [] Low Back       []  [x]  [x] 10 mins   [] 15 mins   [] 20 mins     [x] 34 degrees   [] 36 degrees   [] 40

## 2025-01-27 ENCOUNTER — HOSPITAL ENCOUNTER (OUTPATIENT)
Dept: PHYSICAL THERAPY | Facility: CLINIC | Age: 20
Setting detail: THERAPIES SERIES
Discharge: HOME | End: 2025-01-27
Payer: COMMERCIAL

## 2025-01-27 PROCEDURE — 97016 VASOPNEUMATIC DEVICE THERAPY: CPT

## 2025-01-27 PROCEDURE — 97110 THERAPEUTIC EXERCISES: CPT

## 2025-01-27 NOTE — FLOWSHEET NOTE
than 80% impairment  []  []  []      2. Reduce pain levels to 0/10 or less with ADLs []  []  []      3. Increase MMT to 5/5 throughout to ease functional limitations and mobility  []  []  []                                  Patient goals: pain relief, walk normal       Pt. Education:  [x] Yes  [] No  [x] Reviewed Prior HEP/Ed  Method of Education: [x] Verbal  [x] Demo  [x] Written      Access Code: [email or web address]  URL: [email or web address]  Date: 12/17/2024  Prepared by: Marie Prado    Exercises  - Long Sitting Quad Set  - 2-3 x daily - 7 x weekly - 3 sets - 10 reps - 10 (sec) hold  - Long Sitting Calf Stretch with Strap  - 2-3 x daily - 7 x weekly - 3 sets - 30 (sec) hold  - Seated Table Hamstring Stretch  - 2-3 x daily - 7 x weekly - 3 sets - 30 (sec) hold  - Long Sitting 4 Way Patellar [place]  - 1 x daily - 7 x weekly - 3 sets - 10 reps  - Supine Active Straight Leg Raise  - 1 x daily - 7 x weekly - 3 sets - 10 reps  - Mini Squat with Chair  - 1 x daily - 7 x weekly - 3 sets - 10 reps  - Mini Lunge  - 1 x daily - 7 x weekly - 3 sets - 10 reps  - Supine Knee Extension Strengthening  - 1 x daily - 7 x weekly - 3 sets - 10 reps    Comprehension of Education:  [x] Verbalizes understanding.  [] Demonstrates understanding.  [x] Needs review.  [] Demonstrates/verbalizes HEP/Ed previously given.       Plan: [x] Continue current frequency toward long and short term goals.          Time In: 1:55pm       Time Out: 3:10pm      Electronically signed by:  Marie Prado, PT

## 2025-01-28 ENCOUNTER — APPOINTMENT (OUTPATIENT)
Dept: PHYSICAL THERAPY | Facility: CLINIC | Age: 20
End: 2025-01-28
Payer: COMMERCIAL

## 2025-01-29 ENCOUNTER — HOSPITAL ENCOUNTER (OUTPATIENT)
Dept: PHYSICAL THERAPY | Facility: CLINIC | Age: 20
Setting detail: THERAPIES SERIES
Discharge: HOME | End: 2025-01-29
Payer: COMMERCIAL

## 2025-01-29 ENCOUNTER — OFFICE VISIT (OUTPATIENT)
Dept: ORTHOPEDIC SURGERY | Age: 20
End: 2025-01-29
Payer: COMMERCIAL

## 2025-01-29 VITALS — RESPIRATION RATE: 14 BRPM | WEIGHT: 188 LBS | HEIGHT: 67 IN | BODY MASS INDEX: 29.51 KG/M2 | OXYGEN SATURATION: 100 %

## 2025-01-29 DIAGNOSIS — S86.811D RUPTURE OF RIGHT PATELLAR TENDON, SUBSEQUENT ENCOUNTER: Primary | ICD-10-CM

## 2025-01-29 PROCEDURE — 97110 THERAPEUTIC EXERCISES: CPT

## 2025-01-29 PROCEDURE — 97016 VASOPNEUMATIC DEVICE THERAPY: CPT

## 2025-01-29 PROCEDURE — 99213 OFFICE O/P EST LOW 20 MIN: CPT | Performed by: ORTHOPAEDIC SURGERY

## 2025-01-29 ASSESSMENT — ENCOUNTER SYMPTOMS
ROS SKIN COMMENTS: NEGATIVE FOR RASH
SHORTNESS OF BREATH: 0
ABDOMINAL PAIN: 0
EYE DISCHARGE: 0

## 2025-01-29 NOTE — FLOWSHEET NOTE
Left   Right   Time   Temperature  Compression   Comments    [] Shoulder   [] Elbow  [] Hip   [x] Knee  [] Ankle   [] Boot   [] Low Back       []  [x]  [x] 10 mins   [] 15 mins   [] 20 mins     [x] 34 degrees   [] 36 degrees   [] 40 degrees   [] __ degrees  [] None   [] Low   [] Medium   [x] High                                  Specific Instructions for next treatment: quad strengthening/ knee ROM       Treatment Charges: Mins Units Time In/Out   []  Togolese-Estim      [x]  Ther Exercise 60 4 2:15pm-3:15pm   []  Neuromuscular Re-ed      []  Manual Therapy      [x]  Vasocompression 10 1 3:15pm-3:25pm   []  Other      Total Billable time 70 5 2:05pm-3:25pm       Assessment: [x] Progressing toward goals. Discussed patient's progressions over the next few weeks, focusing on strength until 2/18/25 when we will revisit return to run/begin plyometric activities. He voices understanding. Provided a highlighted print-out listing specific exercises/machines that he can be utilizing at the gym. Discussed frequency options, patient elects to continue at a frequency of 2x/week for now but will let us know if that changes. No complaints of pain with program.     [] No change.     [] Other:  [x] Patient would continue to benefit from skilled physical therapy services in order to: improve gait mechanics, improve quadriceps strengthening, improve patellar mobility, improve standing tolerance, and prepare the patient for functional knee ROM and strength following patellar tendon repair     Goals  MET NOT MET ON-  GOING  Details   Date Addressed: 12/17/24           STG: To be met in 10 treatments            1. ? Pain: Decrease R knee pain levels to 3/10 with ADLs [x]  []  []      2. ? ROM: Increase R knee AROM to at least 0-90 deg (when protocol allows) to reduce difficulty with ADL's []  [x]  []  0-60 degrees ROM as of 12/17/24 , to progress to 90 degrees on 12/31/24 per protocol    3. ? Strength: Patient to demonstrate at least 10

## 2025-01-29 NOTE — PATIENT INSTRUCTIONS
PATIENTIQ:  PatientIQ helps [facility] stay in touch with you to know how you're feeling, and provides education and care instructions to you at various time points.   Your answers help your care team track your progress to provide the best care possible. PatientIQ will contact you pre-op and post-op via email or text with:  Educational Videos and Care Instructions  Questionnaires About How You're Feeling    Your participation provides you valuable education and helps [facility] continue to provide quality care to all patients. Thank you

## 2025-01-29 NOTE — PROGRESS NOTES
exercises until the brace is no longer necessary. The use of the brace is discretionary, and it can be shortened for comfort. He is encouraged to continue with physical therapy and single-leg workouts to build muscle strength. If any complications arise, he should contact the office immediately.    Follow-up  The patient will follow up in 3 months.    PROCEDURE  The patient underwent a right patellar tendon repair on 10/29/2024.         Follow up:No follow-ups on file.    No orders of the defined types were placed in this encounter.        No orders of the defined types were placed in this encounter.      The patient (or guardian, if applicable) and other individuals in attendance with the patient were advised that Artificial Intelligence will be utilized during this visit to record, process the conversation to generate a clinical note, and support improvement of the AI technology. The patient (or guardian, if applicable) and other individuals in attendance at the appointment consented to the use of AI, including the recording.                 This note is created with the assistance of a speech recognition program.  While intending to generate a document that actually reflects the content of the visit, the document can still have some errors including those of syntax and sound a like substitutions which may escape proof reading.  In such instances, actual meaning can be extrapolated by contextual diversion      Electronically signed by Malachi Bauer DO, FAOAO on 1/29/2025 at 4:38 PM

## 2025-01-30 ENCOUNTER — APPOINTMENT (OUTPATIENT)
Dept: PHYSICAL THERAPY | Facility: CLINIC | Age: 20
End: 2025-01-30
Payer: COMMERCIAL

## 2025-02-04 ENCOUNTER — HOSPITAL ENCOUNTER (OUTPATIENT)
Dept: PHYSICAL THERAPY | Facility: CLINIC | Age: 20
Setting detail: THERAPIES SERIES
Discharge: HOME | End: 2025-02-04
Payer: COMMERCIAL

## 2025-02-04 PROCEDURE — 97110 THERAPEUTIC EXERCISES: CPT

## 2025-02-04 PROCEDURE — 97016 VASOPNEUMATIC DEVICE THERAPY: CPT

## 2025-02-04 NOTE — FLOWSHEET NOTE
Left   Right   Time   Temperature  Compression   Comments    [] Shoulder   [] Elbow  [] Hip   [x] Knee  [] Ankle   [] Boot   [] Low Back       []  [x]  [x] 10 mins   [] 15 mins   [] 20 mins     [x] 34 degrees   [] 36 degrees   [] 40 degrees   [] __ degrees  [] None   [] Low   [] Medium   [x] High                                  Specific Instructions for next treatment: quad strengthening/ knee ROM       Treatment Charges: Mins Units Time In/Out   []  Macanese-Estim      [x]  Ther Exercise 55 4 2:05pm-3:00pm   []  Neuromuscular Re-ed      []  Manual Therapy      [x]  Vasocompression 10 1 3:00pm-3:10pm   []  Other      Total Billable time 65 5 2:05pm-3:10pm       Assessment: [x] Progressing toward goals. Stressed the importance of weaning off the brace at home and in the community. Progressed his strength program this date, fatigue but no pain. To continue strength program as able.     [] No change.     [] Other:  [x] Patient would continue to benefit from skilled physical therapy services in order to: improve gait mechanics, improve quadriceps strengthening, improve patellar mobility, improve standing tolerance, and prepare the patient for functional knee ROM and strength following patellar tendon repair     Goals  MET NOT MET ON-  GOING  Details   Date Addressed: 2/4/25           STG: To be met in 10 treatments (increased visit count to 30)           1. ? Pain: Decrease R knee pain levels to 3/10 with ADLs [x]  []  []      2. ? ROM: Increase R knee AROM to at least 0-90 deg (when protocol allows) to reduce difficulty with ADL's [x]  []  []  0-120 degrees    3. ? Strength: Patient to demonstrate at least 10 SLR without quadriceps lag to ease ADL's  [x]  []  []      4. Patient to demonstrate gait with good technique using crutches and maintaining NWB appropriately  [x]  []  []      5. Independent with Home Exercise Programs [x]  []  []        []  []  []      Date Addressed:            LTG: To be met in 20 treatments

## 2025-02-06 ENCOUNTER — HOSPITAL ENCOUNTER (OUTPATIENT)
Dept: PHYSICAL THERAPY | Facility: CLINIC | Age: 20
Setting detail: THERAPIES SERIES
Discharge: HOME | End: 2025-02-06
Payer: COMMERCIAL

## 2025-02-06 NOTE — FLOWSHEET NOTE
[] [facility]  Outpatient Rehabilitation &  Therapy  [address]  [phone]  [phone] [] [facility]  Outpatient Rehabilitation &  Therapy  [address]  P: [phone]  F: [phone] [] [facility]  Outpatient Rehabilitation &  Therapy  [address]  P: [phone]  F: [phone] [] [facility]  Outpatient Rehabilitation &  Therapy  [address]  [phone]  [phone] [] [facility]  Outpatient Rehabilitation &  Therapy  [address]   P: [phone]  F: [phone]  [] [facility]  Outpatient Rehabilitation &  Therapy  [address]  P: [phone]  F: [phone] [] [facility]  Outpatient Rehabilitation &  Therapy  [address]  P: [phone]  F: [phone] [] [facility]  Outpatient Rehabilitation &  Therapy  [address]  P: [phone]  F: [phone] [] [facility]  Outpatient Rehabilitation &  Therapy  [address]  P: [phone]  F: [phone]  [] [facility] Outpatient Rehabilitation &  Therapy  [address]  P: [phone]  F: [phone]     Therapy Cancel/No Show note    Date: 2025  Patient: Alan Briscoe  : 2005  MRN: [id number]    Cancels/No Shows to date: 3/0    For today's appointment patient:    [x]  Cancelled    [] Rescheduled appointment    [] No-show     Reason given by patient:    [x]  Patient ill    []  Conflicting appointment    [] No transportation      [] Conflict with work    [] No reason given    [] Weather related    [] COVID-19    [] Other:      Comments:        [x] Next appointment was confirmed    Electronically signed by: Hannah Moncada

## 2025-02-11 ENCOUNTER — HOSPITAL ENCOUNTER (OUTPATIENT)
Dept: PHYSICAL THERAPY | Facility: CLINIC | Age: 20
Setting detail: THERAPIES SERIES
Discharge: HOME | End: 2025-02-11
Payer: COMMERCIAL

## 2025-02-11 PROCEDURE — 97110 THERAPEUTIC EXERCISES: CPT

## 2025-02-11 NOTE — FLOWSHEET NOTE
[x] [facility]  Outpatient Rehabilitation &  Therapy  [address]   P: [phone]  F: [phone]        Physical Therapy Daily Treatment Note    Date:  2025  Patient Name:  Alan Briscoe    :  2005  MRN: [id number]  Physician: Dr. Bauer                                                   Insurance: [facility] (vs [facility])  Medical Diagnosis: Rupture of right patellar tendon, subsequent encounter (F26.932N)                Rehab Codes: M62.81 , R26.89 , M25.561, M25.661    Onset date: 10/29/24                                     Next 's appt.: 25  Visit# / total visits:      Cancels/No Shows: 30    Subjective:    Pain:  [] Yes  [x] No Location: R knee Pain Rating: (0-10 scale) 0/10  Pain altered Tx:  [x] No  [] Yes  Action:  Comments: Patient arrives stating stiffness in the knee and is getting over a cold.     Objective:  Precautions:   12-16 weeks post-op: continue to utilize brace unlocked - starting (25)  25 - can begin more aggressively stretching the quadriceps, begin hopping DL/SL - initiate return to run/plyometrics in sagittal plane  Exercise    Reps/ Time Weight/ Level Comments         Elliptical   10'            Slant board gastroc stretch   30\"x3     HS stool stretch   30\"x3           Balance board 5' L5    Mat taps x20  2\" step under the LLE    Tap down lateral and posterior  2x10  8\"    Reverse lunge+HR 2x10     Lateral Monster walks 2x10 ft  Rope     BOSU squats  2x15 10#    Single Leg RDL's 2x15  10# KB     TRX Single Leg Squats  2x10     Wall sits with heel raises  2x15     Wall DL hops x20     Wall SL hops  x20                                        Specific Instructions for next treatment: quad strengthening/ knee ROM       Treatment Charges: Mins Units Time In/Out   []  German-Estim      [x]  Ther Exercise 60 5 2:05pm-3:15pm   []  Neuromuscular Re-ed      []  Manual Therapy      []  Vasocompression      []  Other      Total Billable time

## 2025-02-18 ENCOUNTER — HOSPITAL ENCOUNTER (OUTPATIENT)
Dept: PHYSICAL THERAPY | Facility: CLINIC | Age: 20
Setting detail: THERAPIES SERIES
Discharge: HOME | End: 2025-02-18
Payer: COMMERCIAL

## 2025-02-18 PROCEDURE — 97110 THERAPEUTIC EXERCISES: CPT

## 2025-02-18 PROCEDURE — 97116 GAIT TRAINING THERAPY: CPT

## 2025-02-18 NOTE — FLOWSHEET NOTE
[x] [facility]  Outpatient Rehabilitation &  Therapy  [address]   P: [phone]  F: [phone]        Physical Therapy Daily Treatment Note    Date:  2025  Patient Name:  Alan Briscoe    :  2005  MRN: [id number]  Physician: Dr. Bauer                                                   Insurance: [facility] (vs [facility])  Medical Diagnosis: Rupture of right patellar tendon, subsequent encounter (Z52.681J)                Rehab Codes: M62.81 , R26.89 , M25.561, M25.661    Onset date: 10/29/24                                     Next 's appt.: 25  Visit# / total visits:      Cancels/No Shows: 3    Subjective:    Pain:  [] Yes  [x] No Location: R knee Pain Rating: (0-10 scale) 0/10  Pain altered Tx:  [x] No  [] Yes  Action:  Comments: Patient arrives stating stiffness in the knee and is getting over a cold.     Objective:  Precautions:   12-16 weeks post-op: continue to utilize brace unlocked - starting (25)  25 - can begin more aggressively stretching the quadriceps, begin hopping DL/SL - initiate return to run/plyometrics in sagittal plane  Exercise    Reps/ Time Weight/ Level Comments         Treadmill 10'  30\" run/2' walk - variable speeds          Slant board gastroc stretch   30\"x3     HS stool stretch       Prone quadriceps stretching  3x30\"     Balance board  L5    Mat taps NT  2\" step under the LLE    Tap down lateral and posterior  2x10  8\"    Reverse lunge+HR NT     Lateral Monster walks 2x10 ft  Rope     BOSU squats  2x15 10#    Single Leg RDL's 2x10  10# KB     TRX Single Leg Squats  2x10 L20    Wall sits with heel raises  2x15     Lao Split Squats  2x10  Holding 5# DB    Wall DL hops x20     Ladder drills  2x  Sagittal plane only           Exercise labels NT = not today as patient completed at the gym yesterday                             Specific Instructions for next treatment: quad strengthening/focus absorption       Treatment

## 2025-02-20 ENCOUNTER — HOSPITAL ENCOUNTER (OUTPATIENT)
Dept: PHYSICAL THERAPY | Facility: CLINIC | Age: 20
Setting detail: THERAPIES SERIES
Discharge: HOME | End: 2025-02-20
Payer: COMMERCIAL

## 2025-02-20 PROCEDURE — 97110 THERAPEUTIC EXERCISES: CPT

## 2025-02-20 NOTE — FLOWSHEET NOTE
[x] [facility]  Outpatient Rehabilitation &  Therapy  [address]   P: [phone]  F: [phone]        Physical Therapy Daily Treatment Note    Date:  2025  Patient Name:  Alan Briscoe    :  2005  MRN: [id number]  Physician: Dr. Bauer                                                   Insurance: [facility] (vs [facility]  Medical Diagnosis: Rupture of right patellar tendon, subsequent encounter (M89.698P)                Rehab Codes: M62.81 , R26.89 , M25.561, M25.661    Onset date: 10/29/24                                     Next 's appt.: 25  Visit# / total visits:      Cancels/No Shows: 3    Subjective:    Pain:  [] Yes  [x] No Location: R knee Pain Rating: (0-10 scale) 0/10  Pain altered Tx:  [x] No  [] Yes  Action:  Comments: Patient arrives stating soreness from last visit.       Objective:  Precautions:   12-16 weeks post-op: continue to utilize brace unlocked - starting (25)  25 - can begin more aggressively stretching the quadriceps, begin hopping DL/SL - initiate return to run/plyometrics in sagittal plane  Exercise    Reps/ Time Weight/ Level Comments         Elliptical 10'           SB gastroc stretch   30\"x3     HS stool stretch             Prone quadriceps stretching  3x30\"     Mat taps NT  2\" step under the LLE    Tap down lateral and posterior  2x10  8\"    Lateral Monster walks 2x10 ft  Rope     BOSU squats  2x15 10#    Single Leg RDL's 2x10  10# KB     TRX Single Leg Squats  2x10 L20    Khmer Split Squats  2x10  Holding 5# DB    Reverse Lunge+HR 2x10           Plyometrics      Wall high knees x20     Standing high knees x20     Trampoline DL hops 3x15\"     Trampoline high knees 3x20\"     Total Gym DL hops  x20 L20    Geo jumps forward x10     Geo skater jumps x10     SL                                                    Specific Instructions for next treatment: quad strengthening/focus absorption       Treatment Charges: Mins

## 2025-02-25 ENCOUNTER — HOSPITAL ENCOUNTER (OUTPATIENT)
Dept: PHYSICAL THERAPY | Facility: CLINIC | Age: 20
Setting detail: THERAPIES SERIES
Discharge: HOME | End: 2025-02-25
Payer: COMMERCIAL

## 2025-02-25 PROCEDURE — 97110 THERAPEUTIC EXERCISES: CPT

## 2025-02-25 NOTE — FLOWSHEET NOTE
Assessment: [x] Progressing toward goals. Continued program, progressing single leg strength and stability with fatigue noted. Mild pain in lateral knee with scissors quick feet on ladder, but subsides with rest. Will continue with eccentric strength exercises and plyometrics next visit.           [] No change.     [] Other:  [x] Patient would continue to benefit from skilled physical therapy services in order to: improve gait mechanics, improve quadriceps strengthening, improve patellar mobility, improve standing tolerance, and prepare the patient for functional knee ROM and strength following patellar tendon repair     Goals  MET NOT MET ON-  GOING  Details   Date Addressed: 2/4/25           STG: To be met in 10 treatments (increased visit count to 30)           1. ? Pain: Decrease R knee pain levels to 3/10 with ADLs [x]  []  []      2. ? ROM: Increase R knee AROM to at least 0-90 deg (when protocol allows) to reduce difficulty with ADL's [x]  []  []  0-120 degrees    3. ? Strength: Patient to demonstrate at least 10 SLR without quadriceps lag to ease ADL's  [x]  []  []      4. Patient to demonstrate gait with good technique using crutches and maintaining NWB appropriately  [x]  []  []      5. Independent with Home Exercise Programs [x]  []  []        []  []  []      Date Addressed:            LTG: To be met in 30 treatments            1. Improve score on assessment tool Lower Extremity Functional Scale (LEFS) from 91% impairment to less than 80% impairment  []  []  []      2. Reduce pain levels to 0/10 or less with ADLs []  []  [x]      3. Increase MMT to 5/5 throughout to ease functional limitations and mobility  []  []  [x]  Continued LE weakness, especially eccentric quadriceps   4. Patient to return to running/recreational activities with ease (goal added 2/4/25) []   []   [x]    unable to run at this time due to weakness                Patient goals: pain relief, walk normal       Pt. Education:  [x]

## 2025-02-27 ENCOUNTER — HOSPITAL ENCOUNTER (OUTPATIENT)
Dept: PHYSICAL THERAPY | Facility: CLINIC | Age: 20
Setting detail: THERAPIES SERIES
Discharge: HOME | End: 2025-02-27
Payer: COMMERCIAL

## 2025-02-27 PROCEDURE — 97110 THERAPEUTIC EXERCISES: CPT

## 2025-02-27 NOTE — FLOWSHEET NOTE
[x] [facility]  Outpatient Rehabilitation &  Therapy  [address]   P: [phone]  F: [phone]        Physical Therapy Daily Treatment Note    Date:  2025  Patient Name:  Alan Briscoe    :  2005  MRN: [id number]  Physician: Dr. Bauer                                                   Insurance: [facility] (vs [facility])  Medical Diagnosis: Rupture of right patellar tendon, subsequent encounter (Q22.539Q)                Rehab Codes: M62.81 , R26.89 , M25.561, M25.661    Onset date: 10/29/24                                     Next 's appt.: 25  Visit# / total visits:      Cancels/No Shows: 3/0    Subjective:    Pain:  [] Yes  [x] No Location: R knee Pain Rating: (0-10 scale) 0/10  Pain altered Tx:  [x] No  [] Yes  Action:  Comments: Patient arrives stating sore from last visit but he still go on the elliptical yesterday.        Objective:  Precautions:   12-16 weeks post-op: continue to utilize brace unlocked - starting (25)  25 - can begin more aggressively stretching the quadriceps, begin hopping DL/SL - initiate return to run/plyometrics in sagittal plane  Exercise    Reps/ Time Weight/ Level Comments         Elliptical 10'           SB gastroc stretch   30\"x3     HS stool stretch   30\"x3           BOSU squats  2x10 14#    Walking lunges x2L 14#          Plyometrics      Trampoline DL hops 3x20\"     Trampoline high knees 3x20\"     Trampoline SL jumps 3x20\"     Geo skater jumps x20 12\"    Rojas rope SL slams x15     Rojas ropes 3-way 2x15\"     Ladder drills  x1     Broad jumps x2L     Puddle jumps x1L     Jump downs+jump explosion x20 12\"                                            Specific Instructions for next treatment: quad strengthening/focus absorption       Treatment Charges: Mins Units Time In/Out   []  [place] Islander-Estim      [x]  Ther Exercise 60 4 3:10pm-4:10pm   []  Neuromuscular Re-ed      []  Manual Therapy      []  Vasocompression      []

## 2025-03-04 ENCOUNTER — HOSPITAL ENCOUNTER (OUTPATIENT)
Dept: PHYSICAL THERAPY | Facility: CLINIC | Age: 20
Setting detail: THERAPIES SERIES
Discharge: HOME | End: 2025-03-04
Payer: COMMERCIAL

## 2025-03-04 PROCEDURE — 97110 THERAPEUTIC EXERCISES: CPT

## 2025-03-04 NOTE — FLOWSHEET NOTE
[x] [facility]  Outpatient Rehabilitation &  Therapy  [address]   P: [phone]  F: [phone]        Physical Therapy Daily Treatment Note    Date:  3/4/2025  Patient Name:  Alan Briscoe    :  2005  MRN: [id number]  Physician: Dr. Bauer                                                   Insurance: [facility] (vs [facility])  Medical Diagnosis: Rupture of right patellar tendon, subsequent encounter (F40.800Z)                Rehab Codes: M62.81 , R26.89 , M25.561, M25.661    Onset date: 10/29/24                                     Next 's appt.: 25  Visit# / total visits:      Cancels/No Shows: 3/0    Subjective:    Pain:  [] Yes  [x] No Location: R knee Pain Rating: (0-10 scale) 0/10  Pain altered Tx:  [x] No  [] Yes  Action:  Comments: Patient arrives stating sore from last visit but he still go on the elliptical yesterday.        Objective:  Precautions:   12-16 weeks post-op: continue to utilize brace unlocked - starting (25)  25 - can begin more aggressively stretching the quadriceps, begin hopping DL/SL - initiate return to run/plyometrics in sagittal plane  Exercise    Reps/ Time Weight/ Level Comments         Elliptical      Treadmill 5'  1' walk/2' run         SB gastroc stretch   30\"x3     HS stool stretch   30\"x3           BOSU squats  2x10 14#    Walking lunges x2L 14#    Monster walks  X2L ea  Blue tube Lateral   Hamstring Curls Total Gym              Plyometrics      Geo DL/DL/SL lateral hops  X7 ea  12\"    Geo skater jumps with SL hop  x10 12\"    Rojas rope SL slams       Single leg jumping in [facility] split squat position  2x7 ea  16\" box with foam   Ladder drills  x1  Including single leg hopping forward    Broad jumps x2L     Puddle jumps      Jump squats  x20                   Specific Instructions for next treatment: quad strengthening/focus absorption       Treatment Charges: Mins Units Time In/Out   []  Welsh-Estim      [x]  Ther

## 2025-03-06 ENCOUNTER — HOSPITAL ENCOUNTER (OUTPATIENT)
Dept: PHYSICAL THERAPY | Facility: CLINIC | Age: 20
Setting detail: THERAPIES SERIES
Discharge: HOME | End: 2025-03-06
Payer: COMMERCIAL

## 2025-03-06 PROCEDURE — 97110 THERAPEUTIC EXERCISES: CPT

## 2025-03-06 NOTE — FLOWSHEET NOTE
[x] [facility]  Outpatient Rehabilitation &  Therapy  [address]   P: [phone]  F: [phone]        Physical Therapy Daily Treatment Note    Date:  3/6/2025  Patient Name:  Alan Briscoe    :  2005  MRN: [id number]  Physician: Dr. Bauer                                                   Insurance: [facility] (vs [facility])  Medical Diagnosis: Rupture of right patellar tendon, subsequent encounter (V98.062L)                Rehab Codes: M62.81 , R26.89 , M25.561, M25.661    Onset date: 10/29/24                                     Next 's appt.: 25  Visit# / total visits:      Cancels/No Shows: 3/0    Subjective:    Pain:  [] Yes  [x] No Location: R knee Pain Rating: (0-10 scale) 0/10  Pain altered Tx:  [x] No  [] Yes  Action:  Comments: Patient arrives stating he is feeling good today.        Objective:  Precautions:   12-16 weeks post-op: continue to utilize brace unlocked - starting (25)  25 - can begin more aggressively stretching the quadriceps, begin hopping DL/SL - initiate return to run/plyometrics in sagittal plane  Exercise    Reps/ Time Weight/ Level Comments         Elliptical/stepper 10'     Treadmill occupied  1' walk/2' run         SB gastroc stretch   30\"x3     HS stool stretch   30\"x3           BOSU squats  2x10 14#    Walking lunges x2L 14#    Monster walks lateral x2L ea  Black tube    Bench squat taps x20            Plyometrics      Rojas rope SL slams  x15     Reverse lunge+hop  x10     Ladder drills  x1L     Broad jumps x2L     Puddle jumps 1L     Jump squats  x10     Box jump downs+hop  x10 12\"            Specific Instructions for next treatment: quad strengthening/focus absorption       Treatment Charges: Mins Units Time In/Out   []  Chinese-Estim      [x]  Ther Exercise 80 5 2:10pm-3:30pm   []  Neuromuscular Re-ed      []  Manual Therapy      []  Vasocompression      []  Other- gait       Total Billable time 80 5 2:10pm-3:30pm

## 2025-03-11 ENCOUNTER — HOSPITAL ENCOUNTER (OUTPATIENT)
Dept: PHYSICAL THERAPY | Facility: CLINIC | Age: 20
Setting detail: THERAPIES SERIES
Discharge: HOME | End: 2025-03-11
Payer: COMMERCIAL

## 2025-03-11 PROCEDURE — 97110 THERAPEUTIC EXERCISES: CPT

## 2025-03-11 NOTE — FLOWSHEET NOTE
[x] [facility]  Outpatient Rehabilitation &  Therapy  [address]   P: [phone]  F: [phone]        Physical Therapy Daily Treatment Note    Date:  3/11/2025  Patient Name:  Alan Briscoe    :  2005  MRN: [id number]  Physician: Dr. Bauer                                                   Insurance: [facility] (vs [facility])  Medical Diagnosis: Rupture of right patellar tendon, subsequent encounter (U49.254F)                Rehab Codes: M62.81 , R26.89 , M25.561, M25.661    Onset date: 10/29/24                                     Next 's appt.: 25  Visit# / total visits:      Cancels/No Shows: 3/0    Subjective:    Pain:  [] Yes  [x] No Location: R knee Pain Rating: (0-10 scale) 0/10  Pain altered Tx:  [x] No  [] Yes  Action:  Comments: Patient arrives stating some soreness from last visit and has been compliant with HEP at the gym.          Objective:  Precautions: none   Exercise    Reps/ Time Weight/ Level Comments         Elliptical/stepper 10'           SB gastroc stretch   30\"x3     HS stool stretch   30\"x3           BOSU squats  2x10 14#    Walking lunges x2L 14#    Monster walks lateral x2L ea  Black tube    Bench squat taps x20            Plyometrics-Turf      Rojas rope SL slams x15     Reverse lunge+hop  x10     Ladder drills  x1L     Broad jumps x2L     Puddle jumps 1L     Vertical jumps  x10     Box jump downs+hop  x10 18\"    Sled pulls  2L  135#            Specific Instructions for next treatment: quad strengthening/focus absorption       Treatment Charges: Mins Units Time In/Out   []  Samoan-Estim      [x]  Ther Exercise 50 3 2:10pm-3:00pm   []  Neuromuscular Re-ed      []  Manual Therapy      []  Vasocompression      []  Other- gait       Total Billable time 50 3 2:10pm-3:00pm       Assessment: [x] Progressing toward goals. Continued with progressions, fatigue noted throughout. No pain. Good control and equal landing with all jumping exercises. Will

## 2025-03-18 ENCOUNTER — HOSPITAL ENCOUNTER (OUTPATIENT)
Dept: PHYSICAL THERAPY | Facility: CLINIC | Age: 20
Setting detail: THERAPIES SERIES
Discharge: HOME | End: 2025-03-18
Payer: COMMERCIAL

## 2025-03-18 PROCEDURE — 97110 THERAPEUTIC EXERCISES: CPT

## 2025-03-25 ENCOUNTER — HOSPITAL ENCOUNTER (OUTPATIENT)
Dept: PHYSICAL THERAPY | Facility: CLINIC | Age: 20
Setting detail: THERAPIES SERIES
Discharge: HOME | End: 2025-03-25
Payer: COMMERCIAL

## 2025-03-25 PROCEDURE — 97110 THERAPEUTIC EXERCISES: CPT

## 2025-03-25 NOTE — FLOWSHEET NOTE
[x] [facility]  Outpatient Rehabilitation &  Therapy  [address]   P: [phone]  F: [phone]        Physical Therapy Daily Treatment Note    Date:  3/25/2025  Patient Name:  Alan Briscoe    :  2005  MRN: [id number]  Physician: Dr. Bauer                                                   Insurance: [facility] (vs [facility])  Medical Diagnosis: Rupture of right patellar tendon, subsequent encounter (M36.096O)                Rehab Codes: M62.81 , R26.89 , M25.561, M25.661    Onset date: 10/29/24                                     Next 's appt.: 25  Visit# / total visits:  (increased visit number to 35 visits)    Cancels/No Shows: 3/0    Subjective:    Pain:  [] Yes  [x] No Location: R knee Pain Rating: (0-10 scale) 0/10  Pain altered Tx:  [x] No  [] Yes  Action:  Comments: Patient reports that his knee has not felt good over the last week, possibly due to an awkward landing he had last week. Knee is feeling much better today. He was initially dealing with some pain in his back, but this has improved since he has been back to his normal workout routine. He has been going bowling as well as doing some short distance jogging on turf at the gym.     Objective:  Precautions: none   Exercise    Reps/ Time Weight/ Level Comments         Elliptical/stepper 5'/5'  Half before session, half after          SB gastroc stretch   30\"x3     HS stool stretch   30\"x3           BOSU squats    Single 15# DB    Walking lunges with dumbbells x2L 15# DB    Monster walks lateral    Black tube    Double leg squat jumps          Bench squat taps              Plyometrics-Turf      Rojas rope squat holds  x20\" ea      Rojas rope SL slams x10     Reverse lunge+hop  2x10     Ladder drills  HEP     Broad jumps x1L     Puddle jumps x1L     Box jump downs+hop   18\"    Step ups with march  2x10 18\" RLE only today   Sled pulls  3L  135#    Jogging on turf          Specific Instructions for next

## 2025-04-01 ENCOUNTER — HOSPITAL ENCOUNTER (OUTPATIENT)
Dept: PHYSICAL THERAPY | Facility: CLINIC | Age: 20
Setting detail: THERAPIES SERIES
Discharge: HOME | End: 2025-04-01
Payer: COMMERCIAL

## 2025-04-01 PROCEDURE — 97110 THERAPEUTIC EXERCISES: CPT

## 2025-04-01 NOTE — FLOWSHEET NOTE
[x] [facility]  Outpatient Rehabilitation &  Therapy  [address]   P: [phone]  F: [phone]        Physical Therapy Daily Treatment Note    Date:  2025  Patient Name:  Alan Briscoe    :  2005  MRN: [id number]  Physician: Dr. Bauer                                                   Insurance: [facility] (vs [facility])  Medical Diagnosis: Rupture of right patellar tendon, subsequent encounter (E71.924U)                Rehab Codes: M62.81 , R26.89 , M25.561, M25.661    Onset date: 10/29/24                                     Next 's appt.: 25  Visit# / total visits:     Cancels/No Shows: 3/0    Subjective:    Pain:  [] Yes  [x] No Location: R knee Pain Rating: (0-10 scale) 0/10  Pain altered Tx:  [x] No  [] Yes  Action:  Comments: Patient arrives stating no issues to report.     Objective:  Precautions: None   Exercise    Reps/ Time Weight/ Level Comments           Treadmill 5'   x          SB gastroc stretch   30\"x3   x   HS stool stretch   30\"x3   x          BOSU squats    Single 15# DB     Walking lunges with dumbbells x2L 15# DB     Monster walks lateral    Black tube     Double leg squat jumps           Bench squat taps                Plyometrics-Turf       Rojas rope squat holds  x20\" ea       Rojas rope SL slams x10      Reverse lunge+hop  2x10   x   Ladder drills  HEP      Broad jumps x1L   x   Puddle jumps x1L   x   Box jump downs+hop  x20 18\"  x   Step ups with march  2x10 18\" RLE only today    Sled pulls  3L  135#  x   3 box jump up, downs x10   x     Specific Instructions for next treatment: quad strengthening into mid/end range knee flexion, confidence with running        Treatment Charges: Mins Units Time In/Out   []  Canadian-Estim      [x]  Ther Exercise 70 5 2:05pm-3:15pm   []  Neuromuscular Re-ed      []  Manual Therapy      []  Vasocompression      []  Other- gait       Total Billable time 70 5 2:05pm-3:15pm       Assessment: [x] Progressing

## 2025-04-08 ENCOUNTER — HOSPITAL ENCOUNTER (OUTPATIENT)
Dept: PHYSICAL THERAPY | Facility: CLINIC | Age: 20
Setting detail: THERAPIES SERIES
Discharge: HOME | End: 2025-04-08
Payer: COMMERCIAL

## 2025-04-08 PROCEDURE — 97110 THERAPEUTIC EXERCISES: CPT

## 2025-04-08 NOTE — FLOWSHEET NOTE
[x] [facility]  Outpatient Rehabilitation &  Therapy  [address]   P: [phone]  F: [phone]        Physical Therapy Daily Treatment Note    Date:  2025  Patient Name:  Alan Briscoe    :  2005  MRN: [id number]  Physician: Dr. Bauer                                                   Insurance: [facility] (vs [facility])  Medical Diagnosis: Rupture of right patellar tendon, subsequent encounter (F12.032J)                Rehab Codes: M62.81 , R26.89 , M25.561, M25.661    Onset date: 10/29/24                                     Next 's appt.: 25  Visit# / total visits: 34/35    Cancels/No Shows: 3/0    Subjective:    Pain:  [] Yes  [x] No Location: R knee Pain Rating: (0-10 scale) 0/10  Pain altered Tx:  [x] No  [] Yes  Action:  Comments: Patient arrives stating his knee felt worse after last visit but he has been practicing his jumps. States he feels weak today.       Objective:  Exercise    Reps/ Time Weight/ Level Comments           Treadmill 10'   x          SB gastroc stretch   30\"x3   x   HS stool stretch   30\"x3   x          Plyometrics-Turf       Rojas rope squat holds  x20\" ea       Rojas rope SL slams x10   x   Reverse lunge+hop  2x10 Difficult   x   Ladder drills  HEP      Broad jumps x1L   x   Puddle jumps x1L   x   Box jump downs+hop  x20 18\"  x   Step ups with march  2x10 18\" RLE only today    Sled pulls  3L  135#     3 box jump up, downs x10   x   SL hops  x1L   x     Specific Instructions for next treatment: quad strengthening into mid/end range knee flexion, confidence with running        Treatment Charges: Mins Units Time In/Out   []  Bruneian-Estim      [x]  Ther Exercise 45 3 2:10pm-3:05pm   []  Neuromuscular Re-ed      []  Manual Therapy      []  Vasocompression      []  Other- gait       Total Billable time 45 3 2:10pm-3:05pm       Assessment: [x] Progressing toward goals. Patient able to jump up on the 24 inch box compared to last visit. Progressed

## 2025-04-15 ENCOUNTER — HOSPITAL ENCOUNTER (OUTPATIENT)
Dept: PHYSICAL THERAPY | Facility: CLINIC | Age: 20
Setting detail: THERAPIES SERIES
Discharge: HOME | End: 2025-04-15
Payer: COMMERCIAL

## 2025-04-15 PROCEDURE — 97110 THERAPEUTIC EXERCISES: CPT

## 2025-04-15 NOTE — FLOWSHEET NOTE
[x] [facility]  Outpatient Rehabilitation &  Therapy  [address]   P: [phone]  F: [phone]        Physical Therapy Daily Treatment Note    Date:  4/15/2025  Patient Name:  Alan Briscoe    :  2005  MRN: [id number]  Physician: Dr. Bauer                                                   Insurance: [facility] (vs [facility])  Medical Diagnosis: Rupture of right patellar tendon, subsequent encounter (H12.766L)                Rehab Codes: M62.81 , R26.89 , M25.561, M25.661    Onset date: 10/29/24                                     Next 's appt.: 25  Visit# / total visits: 35/35    Cancels/No Shows: 3/0    Subjective:    Pain:  [] Yes  [x] No Location: R knee Pain Rating: (0-10 scale) 0/10  Pain altered Tx:  [x] No  [] Yes  Action:  Comments: Patient has only been able to workout 2x/last week in general.      Objective:  Exercise    Reps/ Time Weight/ Level Comments           Treadmill 6'  2' run/1' walk for 6 min  x          SB gastroc stretch   30\"x3   x   HS stool stretch   30\"x3   x          Plyometrics-Turf       Rojas rope squat holds  x20\" ea       Rojas rope SL slams  x10      Reverse lunge+hop  2x10    x   Ladder drills  HEP      Broad jumps x1L   x   Puddle jumps       Box jump downs+hop  x20 20\"  x   Step ups with march  2x10 18\" RLE only today x   Squats to 18\" step   2x10 1 set 25#   1 set 40#  x   Squat holds 10x10\" 40# DB  x   Split Squats  1x15 A   1x15  Second set 25# DB's  x   Sled pulls  3L  135#  x   box jumps x10 20\" soft box   x   SL hops  x1L   x     Specific Instructions for next treatment: quad strengthening into mid/end range knee flexion, confidence with running       Treatment Charges: Mins Units Time In/Out   []  Bahraini-Estim      [x]  Ther Exercise 65 4 2:10pm-3:15pm   []  Neuromuscular Re-ed      []  Manual Therapy      []  Vasocompression      []  Other- gait       Total Billable time 65 4 2:10pm-3:15pm       Assessment: [x] Progressing

## 2025-04-22 ENCOUNTER — HOSPITAL ENCOUNTER (OUTPATIENT)
Dept: PHYSICAL THERAPY | Facility: CLINIC | Age: 20
Setting detail: THERAPIES SERIES
Discharge: HOME | End: 2025-04-22
Payer: COMMERCIAL

## 2025-04-22 ENCOUNTER — HOSPITAL ENCOUNTER (OUTPATIENT)
Age: 20
Setting detail: THERAPIES SERIES
Discharge: HOME | End: 2025-04-22
Payer: COMMERCIAL

## 2025-04-22 PROCEDURE — 97110 THERAPEUTIC EXERCISES: CPT

## 2025-04-22 PROCEDURE — 97110 THERAPEUTIC EXERCISES: CPT | Performed by: PHYSICAL THERAPIST

## 2025-04-22 NOTE — FLOWSHEET NOTE
[x] [facility]       Outpatient Physical        Therapy       [address]       Phone: [phone]       Fax: [phone]     Physical Therapy Biodex Testing Note    Patient:Alan Vernonree  YOB: 2005  MRN: [id number]    Date: 4/22/2025  Physician: Dr. Bauer                                                   Insurance: [facility] (vs [facility])  Medical Diagnosis: Rupture of right patellar tendon, subsequent encounter (R95.178N)                Rehab Codes: M62.81 , R26.89 , M25.561, M25.661    Onset date: 10/29/24                                     Next 's appt.: 4/30/25  Visit# / total visits: 36                   Cancels/No Shows: 3/0      Subjective:    Pain:  [] Yes  [x] No Location: Right knee   Pain Rating: (0-10 scale) 0/10  Pain altered Tx:  [x] No  [] Yes  Action:  Comments:  Patient did not report any pain upon arrival.  Prior to test, patient educated in correct technique and purpose of test.      Objective:  Precautions:  Exercises:  Biodex test for B knee flexion/ext, 60°/60° per second 1st set,  180°/180° per second 2nd set, 300°/300° per second 3rd set. Pt with no complaints after treatment.  Results sent to Marie Prado PT, will forward on to .               Specific Instructions for next treatment:     Treatment Charges: Mins Units   []  Modalities     [x]  Ther Exercise 21 1   []  Manual Therapy     []  Ther Activities     []  Aquatics     []  Vasocompression     []  Other     Total Treatment time 21 min        Assessment: [x] Progressing toward goals.    [] No change.     [] Other:         Pt. Education:  [x] Yes  [] No  [] Reviewed Prior HEP/Ed  Method of Education: [x] Verbal  [x] Demo  [x] Written  Comprehension of Education:  [x] Verbalizes understanding-Pt educated in purpose of test and correct performance.    [x] Demonstrates understanding.  [] Needs review.  [] Demonstrates/verbalizes HEP/Ed previously given.     Plan: [x] Continue per plan of care.   []

## 2025-04-22 NOTE — FLOWSHEET NOTE
[x] [facility]  Outpatient Rehabilitation &  Therapy  [address]   P: [phone]  F: [phone]        Physical Therapy Daily Treatment Note    Date:  2025  Patient Name:  Alan Briscoe    :  2005  MRN: [id number]  Physician: Dr. Bauer                                                   Insurance: [facility] (vs [facility])  Medical Diagnosis: Rupture of right patellar tendon, subsequent encounter (S26.543Z)                Rehab Codes: M62.81 , R26.89 , M25.561, M25.661    Onset date: 10/29/24                                     Next 's appt.: 25  Visit# / total visits: 35/35 (increased visit number to 40)     Cancels/No Shows: 3/0     Subjective:    Pain:  [] Yes  [x] No Location: R knee Pain Rating: (0-10 scale) 0/10  Pain altered Tx:  [x] No  [] Yes  Action:  Comments: Patient has been pushing himself at the gym. Was at [facility] outpatient therapy center prior to his visit getting isokinetic strength tested.       Objective:  Exercise      Reps/ Time Weight/ Level Comments           Treadmill 5'  2' run/1' walk for 5 min  x          SB gastroc stretch   30\"x3      HS stool stretch   30\"x3             Plyometrics-Turf       Rojas rope squat holds  x20\" ea       Rojas rope SL slams  x10      Reverse lunge+hop  2x10    x   Ladder drills  HEP      Broad jumps x1L      Puddle jumps       Box jump downs+hop  x20 20\"     Lateral tap downs  x20 12\"  x   Step ups with march  2x10 18\" RLE only today x   Pistol Squat to box 2x10 18\" box + foam bilat x   Squats to 18\" step   2x10 40# DB  x   Squat holds  2x20\" 40# DB  x   Heel Elevated squats  2x10  40# DB for second set   Heels on low level slantboard  x   Single leg wall sit holds   3x20\"   x   Split Squats  1x15 A   1x15  Second set 25# DB's     Sled pulls  3L  175#  x   box jumps x10 20\" soft box      SL hops  x1L         Specific Instructions for next treatment: quad strengthening into mid/end range knee flexion,

## 2025-04-30 ENCOUNTER — OFFICE VISIT (OUTPATIENT)
Dept: ORTHOPEDIC SURGERY | Age: 20
End: 2025-04-30
Payer: COMMERCIAL

## 2025-04-30 ENCOUNTER — HOSPITAL ENCOUNTER (OUTPATIENT)
Dept: PHYSICAL THERAPY | Facility: CLINIC | Age: 20
Setting detail: THERAPIES SERIES
Discharge: HOME | End: 2025-04-30
Payer: COMMERCIAL

## 2025-04-30 VITALS — OXYGEN SATURATION: 98 % | WEIGHT: 194 LBS | BODY MASS INDEX: 30.45 KG/M2 | HEIGHT: 67 IN | RESPIRATION RATE: 15 BRPM

## 2025-04-30 DIAGNOSIS — G89.18 POSTOPERATIVE PAIN OF RIGHT KNEE: Primary | ICD-10-CM

## 2025-04-30 DIAGNOSIS — M25.561 POSTOPERATIVE PAIN OF RIGHT KNEE: Primary | ICD-10-CM

## 2025-04-30 PROCEDURE — 99213 OFFICE O/P EST LOW 20 MIN: CPT | Performed by: ORTHOPAEDIC SURGERY

## 2025-04-30 PROCEDURE — 97110 THERAPEUTIC EXERCISES: CPT

## 2025-04-30 NOTE — PROGRESS NOTES
[facility] ORTHOPEDICS AND SPORTS MEDICINE  [address]  [place] [address]  Dept: [phone]  Dept Fax: [phone]        Postoperative follow-up note    Subjective:   History of Present Illness        Alan Briscoe  is a 20 y.o. year old male who presents to our office today for postoperative follow up after having undergone a right patellar tendon repair on 10/29/2024.  Today he is approximately 6 months post operative and is doing exceptionally well overall.  He has been working hard in physical therapy weekly since his last appointment on 1/29, and feels that he is continuing to progress.  He has started running and physical therapy, and states his only real limitation is full speed yes I will keep running.  Right quadriceps atrophy has improved.  The patient denies any recent injury, fevers, chills, nausea, vomiting, diarrhea.  Patient is also active in rehabilitating his injury on his own outside of physical therapy.         Chief Complaint   Patient presents with    Knee Pain     Right knee pain- Rupture of right patellar tendon repair DOS 10/29/24       Review of Systems    I have reviewed the CC, HPI, ROS, PMH, FHX, Social History, and if not present in this note, I have reviewed in the patient's chart.   I agree with the documentation provided by other staff and have reviewed their documentation prior to providing my signature indicating agreement.    Objective :   General: Alan Briscoe is a 20 y.o. male who is alert and oriented and sitting comfortably in our office.  Ortho Exam  Physical Exam         RLE: Mature healed midline knee scar.  No effusion, erythema, ecchymosis.  Nontender to palpation about the patellar tendon.  Very minor quadricep atrophy of the right quad compared to the left but improved compared to previous visit.  Active range of motion of the knee approximately 3 to 130 degrees.  Knee is stable

## 2025-04-30 NOTE — PATIENT INSTRUCTIONS
PATIENTIQ:  PatientIQ helps [facility] stay in touch with you to know how you're feeling, and provides education and care instructions to you at various time points.   Your answers help your care team track your progress to provide the best care possible. PatientIQ will contact you pre-op and post-op via email or text with:  Educational Videos and Care Instructions  Questionnaires About How You're Feeling    Your participation provides you valuable education and helps [facility] continue to provide quality care to all patients. Thank you

## 2025-04-30 NOTE — FLOWSHEET NOTE
[x] [facility]  Outpatient Rehabilitation &  Therapy  [address]   P: [phone]  F: [phone]        Physical Therapy Daily Treatment Note    Date:  2025  Patient Name:  Alan Briscoe    :  2005  MRN: [id number]  Physician: Dr. Bauer                                                   Insurance: [facility] (vs [facility])  Medical Diagnosis: Rupture of right patellar tendon, subsequent encounter (M40.851O)                Rehab Codes: M62.81 , R26.89 , M25.561, M25.661    Onset date: 10/29/24                                     Next 's appt.: 25  Visit# / total visits: 36/40 (increased visit number to 40)     Cancels/No Shows: 3/0     Subjective:    Pain:  [] Yes  [x] No Location: R knee Pain Rating: (0-10 scale) 0/10  Pain altered Tx:  [x] No  [x] Yes  Action:  Comments: Patient had follow up with Dr. Bauer. He is happy with progress so far and just advised to continue to work on strengthening.  20 min late form Ortho visit.       Objective: x= completed   Exercise      Reps/ Time Weight/ Level x Comments            Treadmill 5'   2' run/1' walk for 5 min  x           SB gastroc stretch   30\"x3       HS stool stretch   30\"x3               Plyometrics-Turf        Rojas rope squat holds  x20\" ea        Rojas rope SL slams  x10       Reverse lunge+hop  2x10     x   Ladder drills  HEP       Broad jumps x1L       Puddle jumps        Box jump downs+hop  x20 20\"      Lateral tap downs  x20 12\"/ 15 lb  x Held on L  x   Step ups with march  2x10 18\"/ 25# x RLE only today x   Pistol Squat to box 2x10 18\" box + foam  bilat x   Squats to 90 deg  2x10 40# DB x  x   3 way lunges  20x ea 25 #x2 x     Squat holds  2x20\" 40# DB   x   Heel Elevated squats  2x10   40# DB for second set   Heels on low level slantboard  x   Single leg wall sit holds  10x max  x Slider under R heel  x   Split Squats  1x15 A   1x15   Second set 25# DB's     Sled pulls  3L  175# x  x   box jumps x10 20\" soft

## 2025-05-06 ENCOUNTER — HOSPITAL ENCOUNTER (OUTPATIENT)
Dept: PHYSICAL THERAPY | Facility: CLINIC | Age: 20
Setting detail: THERAPIES SERIES
Discharge: HOME | End: 2025-05-06
Payer: COMMERCIAL

## 2025-05-06 PROCEDURE — 97110 THERAPEUTIC EXERCISES: CPT

## 2025-05-06 NOTE — FLOWSHEET NOTE
impairment to less than 80% impairment  [x]  []  []  22% impairment on 3/25/25    2. Reduce pain levels to 0/10 or less with ADLs []  []  [x]      3. Increase MMT to 5/5 throughout to ease functional limitations and mobility  []  []  [x]  Continued LE weakness, especially eccentric quadriceps into end range knee flexion   4. Patient to return to running/recreational activities with ease (goal added 2/4/25) []   []   [x]    Patient able to run short distances, though antalgia noted                Patient goals: pain relief, walk normal    Pt. Education:  [x] Yes  [] No  [x] Reviewed Prior HEP/Ed  Method of Education: [x] Verbal  [x] Demo  [x] Written    Access Code: [email or web address]  URL: [email or web address]  Date: 12/17/2024  Prepared by: Marie Prado    Exercises  - Long Sitting Quad Set  - 2-3 x daily - 7 x weekly - 3 sets - 10 reps - 10 (sec) hold  - Long Sitting Calf Stretch with Strap  - 2-3 x daily - 7 x weekly - 3 sets - 30 (sec) hold  - Seated Table Hamstring Stretch  - 2-3 x daily - 7 x weekly - 3 sets - 30 (sec) hold  - Long Sitting 4 Way Patellar Wells  - 1 x daily - 7 x weekly - 3 sets - 10 reps  - Supine Active Straight Leg Raise  - 1 x daily - 7 x weekly - 3 sets - 10 reps  - Mini Squat with Chair  - 1 x daily - 7 x weekly - 3 sets - 10 reps  - Mini Lunge  - 1 x daily - 7 x weekly - 3 sets - 10 reps  - Supine Knee Extension Strengthening  - 1 x daily - 7 x weekly - 3 sets - 10 reps      Comprehension of Education:  [x] Verbalizes understanding.  [] Demonstrates understanding.  [x] Needs review.  [] Demonstrates/verbalizes HEP/Ed previously given.       Plan: [x] Continue current frequency toward long and short term goals.           Time In: 2:05 pm       Time Out: 3:10 pm      Electronically signed by:  Marie Prado, PT

## 2025-05-13 ENCOUNTER — HOSPITAL ENCOUNTER (OUTPATIENT)
Dept: PHYSICAL THERAPY | Facility: CLINIC | Age: 20
Setting detail: THERAPIES SERIES
Discharge: HOME | End: 2025-05-13
Payer: COMMERCIAL

## 2025-05-13 PROCEDURE — 97110 THERAPEUTIC EXERCISES: CPT

## 2025-05-13 NOTE — FLOWSHEET NOTE
[x] [facility]  Outpatient Rehabilitation &  Therapy  [address]   P: [phone]  F: [phone]        Physical Therapy Daily Treatment Note    Date:  2025  Patient Name:  Alan Briscoe    :  2005  MRN: [id number]  Physician: Dr. Bauer                                                   Insurance: [facility] (vs [facility])  Medical Diagnosis: Rupture of right patellar tendon, subsequent encounter (H80.716K)                Rehab Codes: M62.81 , R26.89 , M25.561, M25.661    Onset date: 10/29/24                                     Next 's appt.: 25  Visit# / total visits: 38/40 (increased visit number to 40)     Cancels/No Shows: 3/0         Subjective:    Pain:  [] Yes  [x] No Location: R knee Pain Rating: (0-10 scale) 0/10  Pain altered Tx:  [x] No  [x] Yes  Action:  Comments: Patient denies pain on arrival. Reports clumsiness and hit his knee on his car door coming in today.     Objective: x= completed   Exercise      Reps/ Time Weight/ Level Completed 25   Comments          Treadmill 5'  x 2' run/1' walk for 5 min           SB gastroc stretch   30\"x3  x    HS stool stretch   30\"x3  x           Plyometrics-Turf       Rojas rope squat holds        Rojas rope SL slams         Box jump downs+hop  x20 20\" x    Lateral tap downs  x20 12\"/ 20 lb  x Held on L    Step ups with march  2x10 18\"/ 25# x RLE only today   Pistol Squat to box 2x10 20\" box   25# DB  x bilat   Squats to 90 deg  2x10 40# DB     3 way lunges  20x ea 25 #x2 x    Squat holds   40# DB     Heel Elevated Barbell Squats  2x10 Bar only 1 set  75# total second set  Heels on 2, 10# plates   Single leg wall sit holds  5x max  x Slider under L heel   Holding approx 30 sec each    Split Squats  1x15 A   1x15 40#       Sled pulls  3L  175#     box jumps x20 20\" soft box  x    Ladder drills 2L  x All single leg today - forward and lateral   Single forward hops right leg to right leg x1L  x      Specific

## 2025-06-10 ENCOUNTER — OFFICE VISIT (OUTPATIENT)
Age: 20
End: 2025-06-10

## 2025-06-10 VITALS
SYSTOLIC BLOOD PRESSURE: 117 MMHG | RESPIRATION RATE: 16 BRPM | BODY MASS INDEX: 29.82 KG/M2 | OXYGEN SATURATION: 98 % | DIASTOLIC BLOOD PRESSURE: 81 MMHG | HEIGHT: 67 IN | WEIGHT: 190 LBS | TEMPERATURE: 98.7 F | HEART RATE: 84 BPM

## 2025-06-10 DIAGNOSIS — K21.00 GASTROESOPHAGEAL REFLUX DISEASE WITH ESOPHAGITIS WITHOUT HEMORRHAGE: Primary | ICD-10-CM

## 2025-06-10 RX ORDER — SUCRALFATE 1 G/1
1 TABLET ORAL 3 TIMES DAILY PRN
Qty: 60 TABLET | Refills: 0 | Status: SHIPPED | OUTPATIENT
Start: 2025-06-10

## 2025-06-10 RX ORDER — PANTOPRAZOLE SODIUM 20 MG/1
20 TABLET, DELAYED RELEASE ORAL
Qty: 30 TABLET | Refills: 1 | Status: SHIPPED | OUTPATIENT
Start: 2025-06-10

## 2025-06-10 ASSESSMENT — ENCOUNTER SYMPTOMS
EYE REDNESS: 0
SHORTNESS OF BREATH: 0
TROUBLE SWALLOWING: 0
EYE PAIN: 0
VOICE CHANGE: 0
COUGH: 0
BACK PAIN: 0
CONSTIPATION: 0
EYE DISCHARGE: 0
ABDOMINAL DISTENTION: 0
CHEST TIGHTNESS: 0
VOMITING: 0
SORE THROAT: 0
COLOR CHANGE: 0
FACIAL SWELLING: 0
DIARRHEA: 0
NAUSEA: 0
ABDOMINAL PAIN: 1

## 2025-06-10 NOTE — PROGRESS NOTES
[facility] [place] [address]  Phone: [phone]  Fax: [phone]      Alan Briscoe  2005  MRN: [id number]  Date of visit: 6/10/2025      Chief complaint(s): Gastroesophageal Reflux (Gerd feeling in chest x 6 days )      History of present illness :     Alan Briscoe  is a  very pleasant  20 y.o. male  patient presented to the urgent care today  for evaluation of chronic recurrent heartburn.  He reports history of GERD but does not take medications regularly.  He takes over-the-counter medication as needed.  Since last week he has been having constant pain does not resolve with over-the-counter medications.  He denies any vomiting.  No upper or lower GI bleed.  Denies eating or drinking anything out of the ordinary.  Denies take any new medication.  No fever . No sore throat or difficulty swallowing . No earache .  No headache or dizziness . No neck stiffness or pain. No chest pain , shortness of breath or cough . He has been trying over-the-counter medications with minimal relief of symptoms.  He reports epigastric pain but no back pain.  No change in urination or bowel movement.  No neurovascular or motor changes in upper or lower extremities.  No rash.  No recent travel.   All review of systems are mentioned in the HPI otherwise unremarkable.           PAST MEDICAL HISTORY    No past medical history on file.    SURGICAL HISTORY    Past Surgical History:   Procedure Laterality Date    PATELLAR TENDON REPAIR Right 10/29/2024    RIGHT KNEE PATELLA TENDON REPAIR WITH BIOINDUCTIVE IMPLANT performed by Malachi Bauer, DO at [facility] OR       CURRENT MEDICATIONS    Current Outpatient Rx   Medication Sig Dispense Refill    pantoprazole (PROTONIX) 20 MG tablet Take 1 tablet by mouth every morning (before breakfast) 30 tablet 1    sucralfate (CARAFATE) 1 GM tablet Take 1 tablet by mouth 3 times daily as needed (heartburn) 60 tablet 0    ibuprofen

## 2025-06-24 ENCOUNTER — HOSPITAL ENCOUNTER (OUTPATIENT)
Dept: PHYSICAL THERAPY | Facility: CLINIC | Age: 20
Setting detail: THERAPIES SERIES
Discharge: HOME | End: 2025-06-24
Payer: COMMERCIAL

## 2025-06-24 PROCEDURE — 97110 THERAPEUTIC EXERCISES: CPT

## 2025-06-24 NOTE — DISCHARGE SUMMARY
[x] [facility]  Outpatient Rehabilitation &  Therapy  [address]   P: [phone]  F: [phone]        Physical Therapy Daily Treatment and Discharge Note    Date:  2025  Patient Name:  Alan Briscoe    :  2005  MRN: [id number]  Physician: Dr. Bauer                                                   Insurance: [facility] (vs [facility])  Medical Diagnosis: Rupture of right patellar tendon, subsequent encounter (F76.551F)                Rehab Codes: M62.81 , R26.89 , M25.561, M25.661    Onset date: 10/29/24                                     Next 's appt.: 25  Visit# / total visits: 39/40 (increased visit number to 40)     Cancels/No Shows: 3/0         Subjective:    Pain:  [] Yes  [x] No Location: R knee Pain Rating: (0-10 scale) 0/10  Pain altered Tx:  [x] No  [x] Yes  Action:  Comments: Patient denies pain in his knee on arrival. Reports that he ran into someone at work and hit his knee, had some tenderness for a few days following. Has continued to progress his strength program, has been squatting 205#.       Objective: x= completed   Exercise      Reps/ Time Weight/ Level Completed 25   Comments          Treadmill 5'  x 2' run/1' walk for 5 min           SB gastroc stretch   30\"x3  x    HS stool stretch   30\"x3  x           Plyometrics-Turf       Rojas rope squat holds        Rojas rope SL slams         Box jump downs+hop  x20 20\"     Lateral tap downs  x20 12\" x RLE only   Step ups with march  2x10 18\"/ 25#  RLE only today   Pistol Squat to box 2x10 20\" box   20# DB   RLE only   Squats to 90 deg  2x10 40# DB     Reverse lunges  20x ea 20# DB x2  x    Squat holds   40# DB     Heel Elevated Barbell Squats  2x10 Bar only 1 set  75# total second set  Heels on 2, 10# plates   Single leg wall sit holds  5x max   Slider under L heel   Holding approx 30 sec each           Matrix Knee extension  2x10 15# R  50# L  x    Matrix hamstring curls single leg  2x10 60#/70# x

## (undated) DEVICE — GLOVE SURG SZ 75 L12IN FNGR THK79MIL GRN LTX FREE

## (undated) DEVICE — GLOVE SURG SZ 75 CRM LTX FREE POLYISOPRENE POLYMER BEAD ANTI

## (undated) DEVICE — Device

## (undated) DEVICE — SUTURE FIBERWIRE SZ 5 L38IN NONABSORBABLE BLU L48MM 1/2 AR7211

## (undated) DEVICE — SUTURE VICRYL SZ 0 L36IN ABSRB UD L36MM CT-1 1/2 CIR J946H

## (undated) DEVICE — SUTURE ABSORBABLE MONOFILAMENT 2-0 CT-1 24 CM 36 MM VIO PDS+

## (undated) DEVICE — SST TWIST DRILL, AO TYPE, 4.5MM DIA. X 120MM: Brand: MICROAIRE®

## (undated) DEVICE — BRACE ORTH HINGED POST OPERATIVE DONJOY XACT ROM LT

## (undated) DEVICE — INTENDED FOR TISSUE SEPARATION, AND OTHER PROCEDURES THAT REQUIRE A SHARP SURGICAL BLADE TO PUNCTURE OR CUT.: Brand: BARD-PARKER ® CARBON RIB-BACK BLADES

## (undated) DEVICE — SUTURE STRATAFIX SPRL SZ 3-0 L5IN ABSRB UD FS L26MM 3/8 CIR SXMP2B411

## (undated) DEVICE — GOWN,AURORA,NON-REINFORCED,2XL: Brand: MEDLINE

## (undated) DEVICE — STOCKINETTE,IMPERVIOUS,12X48,STERILE: Brand: MEDLINE

## (undated) DEVICE — STAZ LOWER EXTREMITY: Brand: MEDLINE INDUSTRIES, INC.

## (undated) DEVICE — BANDAGE COBAN 6 IN WND 6INX5YD FOAM

## (undated) DEVICE — 3M™ IOBAN™ 2 ANTIMICROBIAL INCISE DRAPE 6650EZ: Brand: IOBAN™ 2

## (undated) DEVICE — PADDING CAST W6INXL4YD COT LO LINTING WYTEX

## (undated) DEVICE — YANKAUER,FLEXIBLE HANDLE,REGLR CAPACITY: Brand: MEDLINE INDUSTRIES, INC.

## (undated) DEVICE — 4-PORT MANIFOLD: Brand: NEPTUNE 2

## (undated) DEVICE — DRESSING FOAM W4XL10IN AG SIL ADH ANTIMIC POSTOP OPTIFOAM

## (undated) DEVICE — DRAPE,REIN 53X77,STERILE: Brand: MEDLINE

## (undated) DEVICE — SUTURE ABSORBABLE MONOFILAMENT 1 CTX 36 CM 48 MM VIO PDS +

## (undated) DEVICE — TOWEL,OR,DSP,ST,BLUE,DLX,XR,4/PK,20PK/CS: Brand: MEDLINE

## (undated) DEVICE — PAD,ABDOMINAL,5"X9",ST,LF,25/BX: Brand: MEDLINE INDUSTRIES, INC.

## (undated) DEVICE — GLOVE SURG SZ 85 L12IN FNGR THK79MIL GRN LTX FREE

## (undated) DEVICE — GLOVE SURG SZ 8 CRM LTX FREE POLYISOPRENE POLYMER BEAD ANTI

## (undated) DEVICE — LIQUIBAND RAPID ADHESIVE 36/CS 0.8ML: Brand: MEDLINE

## (undated) DEVICE — ELECTRODE PT RET AD L9FT HI MOIST COND ADH HYDRGEL CORDED

## (undated) DEVICE — GLOVE SURG SZ 85 CRM LTX FREE POLYISOPRENE POLYMER BEAD ANTI

## (undated) DEVICE — SUTURE VICRYL + SZ 2-0 L27IN ABSRB UD CP-1 1/2 CIR REV CUT VCP266H